# Patient Record
Sex: FEMALE | Race: WHITE | Employment: PART TIME | URBAN - METROPOLITAN AREA
[De-identification: names, ages, dates, MRNs, and addresses within clinical notes are randomized per-mention and may not be internally consistent; named-entity substitution may affect disease eponyms.]

---

## 2017-02-08 NOTE — PROGRESS NOTES
SUBJECTIVE:                                                    Yolande Yadav is a 21 year old female who presents to clinic today for the following health issues:    Musculoskeletal problem/pain      Duration: years, worse over last 2 weeks    Description  Location: right wrist    Intensity:  moderate    Accompanying signs and symptoms: weakness of wrist and swelling, tight feeling    History  Previous similar problem: no   Previous evaluation:  none    Precipitating or alleviating factors:  Trauma or overuse: no   Aggravating factors include: typing, push-ups, lifting weights, writing    Therapies tried and outcome: heat, ice and support wrap       SUBJECTIVE:  Patient presenting with 1-2 years of right wrist pain and discomfort that has worsened over the past 2-3 weeks causing her to seek medical advice. 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 She denies any history of trauma. She describes the pain as constant, 8/10 in severity and localized to the right wrist (over both the medial and lateral aspects of the anterior surface). She endorses some numbness and weakness in the same distribution as the pain. She denies tingling. She is right handed. Her pain has limited her ability to perform activities such as typing and piano playing. She exercises regularly with the soccer team which includes weight lifting, but her exercise routine and daily activities have not changed recently. Tried inbuprofen, ice, wrists brace with some minimal relief of pain.    ROS: 10 point ROS neg other than the symptoms noted above in the HPI.    OBJECTIVE:  /64 mmHg  Pulse 74  Temp(Src) 97.6  F (36.4  C) (Oral)  Resp 12  Wt 180 lb (81.647 kg)  SpO2 99%    GENERAL APPEARANCE: healthy, alert and no distress  EYES: Eyes grossly normal to inspection and conjunctivae and sclerae normal  RESP: Breathing comfortably on room air, no wheezing or respiratory distress.  CV:  extremities warm and well perfused. No edema noted.   MUSC: Tenderness to palpation over the tendons of her right wrist on the anterior aspect of her wrist. No tenderness over the posterior wrist. Right wrist has full range of motion but reports tenderness with flexion (lateral > medial; posterior = anterior flexion). No erythema, swelling or warmth noted. Strength with squeeze -5/5 on right and 5/5 left. Phalen's and Tinel's tests negative.  PSYCH: mentation appears normal and affect normal/bright       ASSESSMENT/PLAN:  Right wrist pain  (primary encounter diagnosis):  Comment: Likely tendonitis 2/2 overuse and physical activity. XR not warranted at this time in the absence of trauma.  Plan: Hand PT referral and recommend ice and NSAIDs and wrist immobilization.   -[facility], AND CHIR[facility]RACTIC REFERRAL    Scribed by Fred Chavez, Medical Student for PRETTY Zhu CNP based on the providers statements to me.    I have reviewed and edited the medical student s documentation acting as scribe and verify that the history, exam and medical decision making reflect the history, exam and decision making performed by myself today.

## 2017-02-09 ENCOUNTER — OFFICE VISIT (OUTPATIENT)
Dept: FAMILY MEDICINE | Facility: CLINIC | Age: 22
End: 2017-02-09
Payer: COMMERCIAL

## 2017-02-09 VITALS
WEIGHT: 180 LBS | RESPIRATION RATE: 12 BRPM | DIASTOLIC BLOOD PRESSURE: 64 MMHG | BODY MASS INDEX: 30.88 KG/M2 | OXYGEN SATURATION: 99 % | TEMPERATURE: 97.6 F | HEART RATE: 74 BPM | SYSTOLIC BLOOD PRESSURE: 112 MMHG

## 2017-02-09 DIAGNOSIS — M25.531 RIGHT WRIST PAIN: Primary | ICD-10-CM

## 2017-02-09 PROCEDURE — 99213 OFFICE O/P EST LOW 20 MIN: CPT | Performed by: NURSE PRACTITIONER

## 2017-02-09 NOTE — NURSING NOTE
"Chief Complaint   Patient presents with     Musculoskeletal Problem       Initial /64 mmHg  Pulse 74  Temp(Src) 97.6  F (36.4  C) (Oral)  Resp 12  Wt 180 lb (81.647 kg)  SpO2 99% Estimated body mass index is 30.88 kg/(m^2) as calculated from the following:    Height as of 11/22/16: 5' 4\" (1.626 m).    Weight as of this encounter: 180 lb (81.647 kg).  Medication Reconciliation: complete     Clara Steel, CMA    "

## 2017-02-09 NOTE — MR AVS SNAPSHOT
After Visit Summary   2/9/2017    Yolande Yadav    MRN: [id number]           Patient Information     Date Of Birth          1995        Visit Information        Provider Department      2/9/2017 9:20 AM Fabiola Nj APRN CNP [facility]        Today's Diagnoses     Right wrist pain    -  1       Care Instructions    1.  I suspect tendonitis of right wrist is causing your pain, I dont recommend an xray today.  See hand therapy to get brace and develop specific rehab exercise program.  Continue ice 3-4 times a day, ibuprofen 3-4 tabs three times a day.  If not improving in 2-3 weeks mychart me for ortho referral.        Follow-ups after your visit        Additional Services     [facility] PT, HAND, AND CHIROPRACTIC REFERRAL       **This order will print in the [facility] Scheduling Office**    Physical Therapy, Hand Therapy and Chiropractic Care are available through:    *[facility]  *[facility]  *[facility]    Call one number to schedule at any of the above locations: [phone].    Your provider has referred you to: Hand Therapy    Indication/Reason for Referral: Wrist Pain  Onset of Illness: years  Therapy Orders: Evaluate and Treat  Special Programs: None  Special Request: None    Yasir Cooley      Additional Comments for the Therapist or Chiropractor:       Please be aware that coverage of these services is subject to the terms and limitations of your health insurance plan.  Call member services at your health plan with any benefit or coverage questions.      Please bring the following to your appointment:    *Your personal calendar for scheduling future appointments  *Comfortable clothing                  Who to contact     If you have questions or need follow up information about today's clinic visit or your schedule please contact [facility] directly at [phone].  Normal or non-critical lab and imaging  results will be communicated to you by uSpeakhart, letter or phone within 4 business days after the clinic has received the results. If you do not hear from us within 7 days, please contact the clinic through Chelaile or phone. If you have a critical or abnormal lab result, we will notify you by phone as soon as possible.  Submit refill requests through Chelaile or call your pharmacy and they will forward the refill request to us. Please allow 3 business days for your refill to be completed.          Additional Information About Your Visit        Chelaile Information     Chelaile gives you secure access to your electronic health record. If you see a primary care provider, you can also send messages to your care team and make appointments. If you have questions, please call your primary care clinic.  If you do not have a primary care provider, please call [phone] and they will assist you.        Care EveryWhere ID     This is your Care EveryWhere ID. This could be used by other organizations to access your [facility] medical records  [id number]        Your Vitals Were     Pulse Temperature Respirations Pulse Oximetry          74 97.6  F (36.4  C) (Oral) 12 99%         Blood Pressure from Last 3 Encounters:   02/09/17 112/64   11/22/16 114/64   10/20/16 96/59    Weight from Last 3 Encounters:   02/09/17 180 lb (81.647 kg)   11/22/16 178 lb (80.74 kg)   10/20/16 179 lb (81.194 kg)              We Performed the Following     [facility], HAND, AND CHIROPRACTIC REFERRAL        Primary Care Provider    None Specified       No primary provider on file.        Thank you!     Thank you for choosing [facility]  for your care. Our goal is always to provide you with excellent care. Hearing back from our patients is one way we can continue to improve our services. Please take a few minutes to complete the written survey that you may receive in the mail after your visit with us. Thank you!             Your Updated  Medication List - Protect others around you: Learn how to safely use, store and throw away your medicines at www.disposemymeds.org.          This list is accurate as of: 2/9/17  9:51 AM.  Always use your most recent med list.                   Brand Name Dispense Instructions for use    ACETAMINOPHEN PO          albuterol 108 (90 BASE) MCG/ACT Inhaler    PROAIR HFA/PROVENTIL HFA/VENTOLIN HFA     Inhale 2 puffs into the lungs every 6 hours       FLUOXETINE HCL PO      Take 50 mg by mouth daily       IBUPROFEN PO

## 2017-02-09 NOTE — PATIENT INSTRUCTIONS
1.  I suspect tendonitis of right wrist is causing your pain, I dont recommend an xray today.  See hand therapy to get brace and develop specific rehab exercise program.  Continue ice 3-4 times a day, ibuprofen 3-4 tabs three times a day.  If not improving in 2-3 weeks mychart me for ortho referral.

## 2017-02-17 ENCOUNTER — TRANSFERRED RECORDS (OUTPATIENT)
Dept: HEALTH INFORMATION MANAGEMENT | Facility: CLINIC | Age: 22
End: 2017-02-17

## 2017-02-17 LAB
CHOLEST SERPL-MCNC: 153 MG/DL (ref 0–200)
GLUCOSE SERPL-MCNC: 72 MG/DL (ref 70–99)
HDLC SERPL-MCNC: 58 MG/DL (ref 40–60)
LDLC SERPL CALC-MCNC: 69 MG/DL
PAP-ABSTRACT: NORMAL
TRIGL SERPL-MCNC: 132 MG/DL (ref 30–200)

## 2017-02-23 ENCOUNTER — THERAPY VISIT (OUTPATIENT)
Dept: OCCUPATIONAL THERAPY | Facility: CLINIC | Age: 22
End: 2017-02-23
Payer: COMMERCIAL

## 2017-02-23 DIAGNOSIS — R29.898 MECHANICAL LIMB PROBLEMS: ICD-10-CM

## 2017-02-23 DIAGNOSIS — M25.531 RIGHT WRIST PAIN: Primary | ICD-10-CM

## 2017-02-23 PROCEDURE — 29125 APPL SHORT ARM SPLINT STATIC: CPT | Mod: GO | Performed by: OCCUPATIONAL THERAPIST

## 2017-02-23 PROCEDURE — 97165 OT EVAL LOW COMPLEX 30 MIN: CPT | Mod: GO | Performed by: OCCUPATIONAL THERAPIST

## 2017-02-23 NOTE — MR AVS SNAPSHOT
After Visit Summary   2/23/2017    Yolande Yadav    MRN: [id number]           Patient Information     Date Of Birth          1995        Visit Information        Provider Department      2/23/2017 9:00 AM Kalli Daniels OT [facility]        Today's Diagnoses     Right wrist pain    -  1    Mechanical limb problems           Follow-ups after your visit        Your next 10 appointments already scheduled     Mar 02, 2017  2:00 PM CST   [facility] Hand with Kalli Daniels OT   [facility])    [address] [place] [id number] [phone]            Mar 09, 2017  9:00 AM CST   [facility] Hand with Kalli Daniels OT   [facility]    [address] [place] [id number] [phone]              Who to contact     If you have questions or need follow up information about today's clinic visit or your schedule please contact [facility] directly at [phone].  Normal or non-critical lab and imaging results will be communicated to you by Ala-Septichart, letter or phone within 4 business days after the clinic has received the results. If you do not hear from us within 7 days, please contact the clinic through MommyCoacht or phone. If you have a critical or abnormal lab result, we will notify you by phone as soon as possible.  Submit refill requests through Brandpotion or call your pharmacy and they will forward the refill request to us. Please allow 3 business days for your refill to be completed.          Additional Information About Your Visit        Ala-Septichart Information     Brandpotion gives you secure access to your electronic health record. If you see a primary care provider, you can also send messages to your care team and make appointments. If you have questions, please call your primary care clinic.  If you do not have a primary  care provider, please call [phone] and they will assist you.        Care EveryWhere ID     This is your Care EveryWhere ID. This could be used by other organizations to access your [place] medical records  [id number]         Blood Pressure from Last 3 Encounters:   02/09/17 112/64   11/22/16 114/64   10/20/16 96/59    Weight from Last 3 Encounters:   02/09/17 81.6 kg (180 lb)   11/22/16 80.7 kg (178 lb)   10/20/16 81.2 kg (179 lb)              We Performed the Following     APPLY SHORT ARM SPLINT STATIC     HC OT EVAL, LOW COMPLEXITY        Primary Care Provider    None Specified       No primary provider on file.        Thank you!     Thank you for choosing [facility]  for your care. Our goal is always to provide you with excellent care. Hearing back from our patients is one way we can continue to improve our services. Please take a few minutes to complete the written survey that you may receive in the mail after your visit with us. Thank you!             Your Updated Medication List - Protect others around you: Learn how to safely use, store and throw away your medicines at www.disposemymeds.org.          This list is accurate as of: 2/23/17  6:42 PM.  Always use your most recent med list.                   Brand Name Dispense Instructions for use    ACETAMINOPHEN PO          albuterol 108 (90 BASE) MCG/ACT Inhaler    PROAIR HFA/PROVENTIL HFA/VENTOLIN HFA     Inhale 2 puffs into the lungs every 6 hours       FLUOXETINE HCL PO      Take 50 mg by mouth daily       IBUPROFEN PO

## 2017-02-23 NOTE — PROGRESS NOTES
Hand Therapy Initial Evaluation    Current Date:  2/23/2017    Diagnosis:  Right  wrist pain  DOO: 1 year ago, but much worse in past month  Referring MD: PRETTY Zhu, CNP  Order date: 2/9/17  Next MD visit:as needed      Subjective:  Yolande Yadav is a 21 year old right hand dominant female.  Patient reports symptoms of pain, weakness/loss of strength and edema (at times with use) of the right  wrist  which occurred due to insidious onset. Since onset symptoms are Gradually getting worse.  Special tests:  none.  Previous treatment: ice, brace, rest, ibuprofen.    General health as reported by patient is good.       Pertinent medical history includes:depression , asthma  Medical allergies:none.  Surgical history: none.  Medication history: anti-inflammatory (Ibuprofen as needed), anti-depressants.    Current occupation is student (adalberto) in Social Work at [employer]  Currently working in normal job without restrictions. Pt works as a . She does escorts for safety, and as to check buildings, lock and unlock doors.   Job and Student Tasks: prolonged sitting, prolonged standing, computer work  Barriers include:none  Prior functional level:  no limitations    Additional Occupational Profile Information (patterns of daily living, interests, values and needs):  Living situation: lives with a roommate in a apt/dorm on campus  Mobility: No difficulty getting around home and community  Transportation: Able to drive own car for transportation. Driving is going fine (has to take brace off)  Daily routine: Internship  Leisure activities / hobbies: Pt is on the soccer team at [employer]: she does weightlifting, conditioning, sprints (plays outside midfield)  Other: Pt is from [place]  Occupational Performance Deficits as reported by patient:Pt notes that even typing and writing has become  A problem lately. She notes that she can't do pushups and bench press hurts. Rows bother the  wrist. She has to work out with the soccer team. It really bothers the wrist to     UEFI: see flowsheet    Objective:    Observation/Appearance: normal R wrist on inspectio  Sensation:  WNL throughout all nerve distributions; per patient report     PAIN 2/23/2017     Location wrist     Description aching     Radiates Yes into volar palm     Worse d/n daytime     Frequency Constant, worse with activity     Exacerbated by Gripping, twisting, pushing (notices the most)     Relieves cold, otc medications and rest     At rest 0-10/10 4-5/10     On use 0-10/10 varies     At worst.0-10/10 8/10     Sleep disruption? no     Progression Started about a year ago but much worse in past month       Patient indicates pain is localized to: volar wrist, radial and ulnar side, narrow areas longitudinally    ROM  Wrist  2/23/2017   AROM (PROM) R L   Extension 47 70   Flexion 44 70   RD 14 33   UD 32 45   Supination 77 90   Pronation 78 85     Wrist and thumb ROM Special Test: + indicates mild pain, ++ indicates moderate pain, +++ indicates severe pain    DATE: 2/23/2017 Active Passive Resisted Comments:   Wrist ext with RD  ++ -    Wrist ext with UD  ++ -    Wrist flexion with RD  + ++    Wrist flexion with UD  + ++    Radial deviation  + +    Ulnar deviation  + +    Pronation  - +    Supination  + +        Tenderness: Pain level report on scale 0-10/10  Date 2/23/2017    Side Right  Left   Ulna Styloid - -   TFCC/fovea + -   DRUJ + -   Radial Styloid - -   Distal Radius - -   Volar Lunate - -   Dorsal Lunate - -   Volar Scaphoid + -   Dorsal Scaphoid + -   Pisiform / Triquetrum + -         Central Dorsal Zone: (+ for hypermobile or - for hypomibile) Pain 0-10/10  Date 2/23/2017    Side RIght Left   Finger Extension Test (SL--in wrist) flex, resist long ext) - -   Hunt Test (SL)  UD to RD -  (Possible hypermobility between scaphoid and lunate) -   Linscheid Test (CMC joints--stabilize distal MC, mob CMC) - -   Ballottement Scaphoid  on Lunate - -     Ulnar Dorsal Zone: (+ for hypermobile or - for hypomibile) Pain 0-10/10  Date 2/23/2017    Side RIght Left   Special tests Deferred  (may need to assess further) NT     STRENGTH:   Deferred    Assessment:   Patient presents with symptoms consistent with above diagnosis of R wrist tendinitis,  with conservative intervention.     Patient's limitations or Problem List includes:  Pain, Weakness, Hypomobility, Decreased  and Tightness in musculature of the right wrist which interferes with the patient's ability to perform Self Care Tasks, Work Tasks and Household Chores as compared to previous level of function.    Rehab Potential:  Excellent - Return to full activity, no limitations    Patient will benefit from skilled Occupational Therapy to increase ROM and overall strength and decrease pain to return to previous activity level and resume normal daily tasks and to reach their rehab potential.    Barriers to Learning:  No barrier    Communication Issues:  Patient appears to be able to clearly communicate and understand verbal and written communication and follow directions correctly.    Assessment of Occupational Performance:  3-5 Performance Deficits  Identified Performance Deficits: dressing, hygiene and grooming, home establishment and management, meal preparation and cleanup, shopping, school, work and volunteer activities      Clinical Decision Making (Complexity): Low complexity    Treatment Explanation:  The following has been discussed with the patient:  RX ordered/plan of care  Anticipated outcomes  Possible risks and side effects    Treatment Plan:   Frequency:  1 X week, once daily  Duration:  for 8 weeks     Modalities:  Fluidotherapy and Paraffin  Therapeutic Exercise:  AROM, PROM, Isotonics, Isometrics and Stabilization  Neuromuscular re-education:  Proprioceptive Training  Manual Techniques:  Joint mobilization, Friction massage and Myofascial release  Orthotic Fabrication:  Static  orthosis and Forearm based orthosis  Self Care:  Self Care Tasks, Ergonomic Considerations and Work Tasks      Discharge Plan:  Achieve all LTG.  Independent in home treatment program.  Reach maximal therapeutic benefit.    Home Exercise Program:  Gentle wrist ext/flex with table assist  Circumferential wrist orthosis for activity/rest    Next Visit:  fluido  Ther ex  F/u with orthosis

## 2017-02-24 DIAGNOSIS — M25.531 RIGHT WRIST PAIN: Primary | ICD-10-CM

## 2017-02-27 ENCOUNTER — RADIANT APPOINTMENT (OUTPATIENT)
Dept: GENERAL RADIOLOGY | Facility: CLINIC | Age: 22
End: 2017-02-27
Attending: NURSE PRACTITIONER
Payer: COMMERCIAL

## 2017-02-27 DIAGNOSIS — M25.531 RIGHT WRIST PAIN: ICD-10-CM

## 2017-02-27 PROCEDURE — 73110 X-RAY EXAM OF WRIST: CPT | Mod: RT

## 2017-02-28 NOTE — PROGRESS NOTES
Great news, your wrist xray is normal with no sign of fracture.    Sincerely,  Dr. Freda Salvador MD  2/28/2017

## 2017-03-02 ENCOUNTER — THERAPY VISIT (OUTPATIENT)
Dept: OCCUPATIONAL THERAPY | Facility: CLINIC | Age: 22
End: 2017-03-02
Payer: COMMERCIAL

## 2017-03-02 DIAGNOSIS — R29.898 MECHANICAL LIMB PROBLEMS: ICD-10-CM

## 2017-03-02 DIAGNOSIS — M25.531 RIGHT WRIST PAIN: ICD-10-CM

## 2017-03-02 PROCEDURE — 97140 MANUAL THERAPY 1/> REGIONS: CPT | Mod: GO | Performed by: OCCUPATIONAL THERAPIST

## 2017-03-02 PROCEDURE — 97022 WHIRLPOOL THERAPY: CPT | Mod: GO | Performed by: OCCUPATIONAL THERAPIST

## 2017-03-02 PROCEDURE — 97110 THERAPEUTIC EXERCISES: CPT | Mod: GO | Performed by: OCCUPATIONAL THERAPIST

## 2017-03-02 NOTE — MR AVS SNAPSHOT
After Visit Summary   3/2/2017    Yolande Yadav    MRN: [id number]           Patient Information     Date Of Birth          1995        Visit Information        Provider Department      3/2/2017 2:00 PM Kalli Daniels OT [facility]        Today's Diagnoses     Right wrist pain        Mechanical limb problems           Follow-ups after your visit        Your next 10 appointments already scheduled     Mar 09, 2017  9:00 AM CST   [facility] with Kalli Daniels OT   [facility] ([facility]    [address] [place] [id number] [phone]            Mar 23, 2017  9:30 AM CDT   [facility] Hand with Kalli Daniels OT   [facility] ([facility]    [address] [place] [id number] [phone]              Who to contact     If you have questions or need follow up information about today's clinic visit or your schedule please contact [facility] directly at [phone].  Normal or non-critical lab and imaging results will be communicated to you by Helidynehart, letter or phone within 4 business days after the clinic has received the results. If you do not hear from us within 7 days, please contact the clinic through Feedgent or phone. If you have a critical or abnormal lab result, we will notify you by phone as soon as possible.  Submit refill requests through mycirQle or call your pharmacy and they will forward the refill request to us. Please allow 3 business days for your refill to be completed.          Additional Information About Your Visit        Helidynehart Information     mycirQle gives you secure access to your electronic health record. If you see a primary care provider, you can also send messages to your care team and make appointments. If you have questions, please call your primary care clinic.  If you do not have a primary care  provider, please call [phone] and they will assist you.        Care EveryWhere ID     This is your Care EveryWhere ID. This could be used by other organizations to access your [place] medical records  [id number]         Blood Pressure from Last 3 Encounters:   02/09/17 112/64   11/22/16 114/64   10/20/16 96/59    Weight from Last 3 Encounters:   02/09/17 81.6 kg (180 lb)   11/22/16 80.7 kg (178 lb)   10/20/16 81.2 kg (179 lb)              We Performed the Following     MANUAL THER TECH,1+REGIONS,EA 15 MIN     THERAPEUTIC EXERCISES     WHIRLPOOL THERAPY        Primary Care Provider    None Specified       No primary provider on file.        Thank you!     Thank you for choosing [facility]  for your care. Our goal is always to provide you with excellent care. Hearing back from our patients is one way we can continue to improve our services. Please take a few minutes to complete the written survey that you may receive in the mail after your visit with us. Thank you!             Your Updated Medication List - Protect others around you: Learn how to safely use, store and throw away your medicines at www.disposemymeds.org.          This list is accurate as of: 3/2/17  8:08 PM.  Always use your most recent med list.                   Brand Name Dispense Instructions for use    ACETAMINOPHEN PO          albuterol 108 (90 BASE) MCG/ACT Inhaler    PROAIR HFA/PROVENTIL HFA/VENTOLIN HFA     Inhale 2 puffs into the lungs every 6 hours       FLUOXETINE HCL PO      Take 50 mg by mouth daily       IBUPROFEN PO

## 2017-03-02 NOTE — PROGRESS NOTES
Hand Therapy SOAP note  information     Current Date:  3/2/2017    Diagnosis:  Right  wrist pain  DOO: 1 year ago, but much worse in past month  Referring MD: Fabiola Nj APRN, CNP  Order date: 2/9/17  Next MD visit:as needed    xrays negative for fracture      Subjective:  The R wrist is feeling a little better. The orthosis is fitting well. Pt has been in workouts with soccer, modifying some strength training, running with orthosis on    Observation/Appearance: normal R wrist on inspection  Sensation:  WNL throughout all nerve distributions; per patient report     PAIN 2/23/2017 3/2/2017      Location wrist Volar wrist, also radial wrist somewhat    Description aching aching    Radiates Yes into volar palm     Worse d/n daytime     Frequency Constant, worse with activity     Exacerbated by Gripping, twisting, pushing (notices the most)     Relieves cold, otc medications and rest     At rest 0-10/10 4-5/10 7/10    On use 0-10/10 varies     At worst.0-10/10 8/10 8/10    Sleep disruption? no     Progression Started about a year ago but much worse in past month       Patient indicates pain is localized to: volar wrist, radial and ulnar side, narrow areas longitudinally    ROM  Wrist  2/23/2017 3/2/2017     AROM (PROM) R L R   Extension 47 70 67   Flexion 44 70 70   RD 14 33 26   UD 32 45 45   Supination 77 90    Pronation 78 85      Wrist and thumb ROM Special Test: + indicates mild pain, ++ indicates moderate pain, +++ indicates severe pain    DATE: 2/23/2017 Active Passive Resisted Comments:   Wrist ext with RD  ++ -    Wrist ext with UD  ++ -    Wrist flexion with RD  + ++    Wrist flexion with UD  + ++    Radial deviation  + +    Ulnar deviation  + +    Pronation  - +    Supination  + +        Tenderness: Pain level report on scale 0-10/10  Date 2/23/2017    Side Right  Left   Ulna Styloid - -   TFCC/fovea + -   DRUJ + -   Radial Styloid - -   Distal Radius - -   Volar Lunate - -   Dorsal Lunate - -   Volar  Scaphoid + -   Dorsal Scaphoid + -   Pisiform / Triquetrum + -         Central Dorsal Zone: (+ for hypermobile or - for hypomibile) Pain 0-10/10  Date 2/23/2017    Side RIght Left   Finger Extension Test (SL--in wrist) flex, resist long ext) - -   Hunt Test (SL)  UD to RD -  (Possible hypermobility between scaphoid and lunate) -   Linscheid Test (CMC joints--stabilize distal MC, mob CMC) - -   Ballottement Scaphoid on Lunate - -     Ulnar Dorsal Zone: (+ for hypermobile or - for hypomibile) Pain 0-10/10  Date 2/23/2017    Side RIght Left   Special tests Deferred  (may need to assess further) NT       Assessment:   Please refer to flow sheet.    Home Exercise Program:  Gentle wrist ext/flex with table assist  Circumferential wrist orthosis for activity/rest  3/2   Artisan stretch  Cross body adduction  Levator scapula stretch    Next Visit:  fluido  Ther ex and progress  Check strength

## 2017-03-09 ENCOUNTER — THERAPY VISIT (OUTPATIENT)
Dept: OCCUPATIONAL THERAPY | Facility: CLINIC | Age: 22
End: 2017-03-09
Payer: COMMERCIAL

## 2017-03-09 DIAGNOSIS — M25.531 RIGHT WRIST PAIN: ICD-10-CM

## 2017-03-09 DIAGNOSIS — R29.898 MECHANICAL LIMB PROBLEMS: ICD-10-CM

## 2017-03-09 PROCEDURE — 97140 MANUAL THERAPY 1/> REGIONS: CPT | Mod: GO | Performed by: OCCUPATIONAL THERAPIST

## 2017-03-09 PROCEDURE — 97110 THERAPEUTIC EXERCISES: CPT | Mod: GO | Performed by: OCCUPATIONAL THERAPIST

## 2017-03-09 PROCEDURE — 97018 PARAFFIN BATH THERAPY: CPT | Mod: GO | Performed by: OCCUPATIONAL THERAPIST

## 2017-03-09 NOTE — MR AVS SNAPSHOT
After Visit Summary   3/9/2017    Yolande Yadav    MRN: [id number]           Patient Information     Date Of Birth          1995        Visit Information        Provider Department      3/9/2017 9:00 AM Kalli Daniels OT [facility]        Today's Diagnoses     Right wrist pain        Mechanical limb problems           Follow-ups after your visit        Your next 10 appointments already scheduled     Mar 23, 2017  9:30 AM CDT   JOSE CRUZ Hand with Kalli Daniels OT   [facility] [place] ( [facility])    [address] [place] [id number] [phone]              Who to contact     If you have questions or need follow up information about today's clinic visit or your schedule please contact [facility] directly at [phone].  Normal or non-critical lab and imaging results will be communicated to you by [employer], letter or phone within 4 business days after the clinic has received the results. If you do not hear from us within 7 days, please contact the clinic through [employer] or phone. If you have a critical or abnormal lab result, we will notify you by phone as soon as possible.  Submit refill requests through [employer] or call your pharmacy and they will forward the refill request to us. Please allow 3 business days for your refill to be completed.          Additional Information About Your Visit        MyChart Information     [employer] gives you secure access to your electronic health record. If you see a primary care provider, you can also send messages to your care team and make appointments. If you have questions, please call your primary care clinic.  If you do not have a primary care provider, please call [phone] and they will assist you.        Care EveryWhere ID     This is your Care EveryWhere ID. This could be used by other organizations to access your [facility]  records  [id number]         Blood Pressure from Last 3 Encounters:   02/09/17 112/64   11/22/16 114/64   10/20/16 96/59    Weight from Last 3 Encounters:   02/09/17 81.6 kg (180 lb)   11/22/16 80.7 kg (178 lb)   10/20/16 81.2 kg (179 lb)              We Performed the Following     MANUAL THER TECH,1+REGIONS,EA 15 MIN     PARAFFIN BATH THERAPY     THERAPEUTIC EXERCISES        Primary Care Provider    None Specified       No primary provider on file.        Thank you!     Thank you for choosing [facility]  for your care. Our goal is always to provide you with excellent care. Hearing back from our patients is one way we can continue to improve our services. Please take a few minutes to complete the written survey that you may receive in the mail after your visit with us. Thank you!             Your Updated Medication List - Protect others around you: Learn how to safely use, store and throw away your medicines at www.disposemymeds.org.          This list is accurate as of: 3/9/17  9:47 AM.  Always use your most recent med list.                   Brand Name Dispense Instructions for use    ACETAMINOPHEN PO          albuterol 108 (90 BASE) MCG/ACT Inhaler    PROAIR HFA/PROVENTIL HFA/VENTOLIN HFA     Inhale 2 puffs into the lungs every 6 hours       FLUOXETINE HCL PO      Take 50 mg by mouth daily       IBUPROFEN PO

## 2017-03-09 NOTE — PROGRESS NOTES
Hand Therapy SOAP note  information     Current Date:  3/9/2017      Diagnosis:  Right  wrist pain  DOO: 1 year ago, but much worse in past month  Referring MD: Fabiola Nj APRN, CNP  Order date: 2/9/17  Next MD visit:as needed    xrays negative for fracture      Subjective:  Pt is wearing the splint about half the day. It is feeling a little better. She has lots of papers due soon.    Observation/Appearance: normal R wrist on inspection  Sensation:  WNL throughout all nerve distributions; per patient report     PAIN 2/23/2017 3/2/2017   3/9/2017     Location wrist Volar wrist, also radial wrist somewhat Volar wrist, also radial wrist somewhat, FCR tendon   Description aching aching    Radiates Yes into volar palm     Worse d/n daytime     Frequency Constant, worse with activity     Exacerbated by Gripping, twisting, pushing (notices the most)     Relieves cold, otc medications and rest     At rest 0-10/10 4-5/10 7/10 6/10, 4/10 post tx   On use 0-10/10 varies     At worst.0-10/10 8/10 8/10    Sleep disruption? no     Progression Started about a year ago but much worse in past month       Patient indicates pain is localized to: volar wrist, radial and ulnar side, narrow areas longitudinally    ROM  Wrist  2/23/2017 3/2/2017      AROM (PROM) R L R R   Extension 47 70 67 67   Flexion 44 70 70 70   RD 14 33 26    UD 32 45 45    Supination 77 90     Pronation 78 85       Wrist and thumb ROM Special Test: + indicates mild pain, ++ indicates moderate pain, +++ indicates severe pain    DATE: 2/23/2017 Active Passive Resisted Comments:   Wrist ext with RD  ++ -    Wrist ext with UD  ++ -    Wrist flexion with RD  + ++    Wrist flexion with UD  + ++    Radial deviation  + +    Ulnar deviation  + +    Pronation  - +    Supination  + +        Tenderness: Pain level report on scale 0-10/10  Date 2/23/2017    Side Right  Left   Ulna Styloid - -   TFCC/fovea + -   DRUJ + -   Radial Styloid - -   Distal Radius - -   Volar Lunate  - -   Dorsal Lunate - -   Volar Scaphoid + -   Dorsal Scaphoid + -   Pisiform / Triquetrum + -         Central Dorsal Zone: (+ for hypermobile or - for hypomibile) Pain 0-10/10  Date 2/23/2017    Side RIght Left   Finger Extension Test (SL--in wrist) flex, resist long ext) - -   Hunt Test (SL)  UD to RD -  (Possible hypermobility between scaphoid and lunate) -   Linscheid Test (CMC joints--stabilize distal MC, mob CMC) - -   Ballottement Scaphoid on Lunate - -     Ulnar Dorsal Zone: (+ for hypermobile or - for hypomibile) Pain 0-10/10  Date 2/23/2017    Side RIght Left   Special tests Deferred  (may need to assess further) NT     Strength:  Pain-free /Pinch Test    3/9/2017   Trials R L   1   30+ 60     Lat Pinch  3/9/2017   Trials R L   1   7 13     3 Pt Pinch  3/9/2017   Trials R L   1   13 10           Assessment:   Please refer to flow sheet.    Home Exercise Program:  Gentle wrist ext/flex with table assist  Circumferential wrist orthosis for activity/rest  3/2   Artisan stretch  Cross body adduction  Levator scapula stretch  3/9  NMReed with strap at proximal row, mobilizing during wrist flex/ext    Next Visit:  MFR T R UE proximal and distal  Possible strengthening  Check proximal strength and scap mobility  Wrist mobs

## 2017-03-23 ENCOUNTER — THERAPY VISIT (OUTPATIENT)
Dept: OCCUPATIONAL THERAPY | Facility: CLINIC | Age: 22
End: 2017-03-23
Payer: COMMERCIAL

## 2017-03-23 DIAGNOSIS — R29.898 MECHANICAL LIMB PROBLEMS: ICD-10-CM

## 2017-03-23 DIAGNOSIS — M25.531 RIGHT WRIST PAIN: ICD-10-CM

## 2017-03-23 PROCEDURE — 97110 THERAPEUTIC EXERCISES: CPT | Mod: GO | Performed by: OCCUPATIONAL THERAPIST

## 2017-03-23 PROCEDURE — 97035 APP MDLTY 1+ULTRASOUND EA 15: CPT | Mod: GO | Performed by: OCCUPATIONAL THERAPIST

## 2017-03-23 PROCEDURE — 97140 MANUAL THERAPY 1/> REGIONS: CPT | Mod: GO | Performed by: OCCUPATIONAL THERAPIST

## 2017-03-23 NOTE — PROGRESS NOTES
Hand Therapy SOAP note  information     Current Date:  3/23/2017    Diagnosis:  Right  wrist pain  DOO: 1 year ago, but much worse in past month  Referring MD: PRETTY Zhu, CNP  Order date: 2/9/17  Next MD visit:as needed    xrays negative for fracture      Subjective:  Pt notes typing is a lot better. She was in the hospital last week, so the wrist was resting. She had a UTI    Observation/Appearance: normal R wrist on inspection  Sensation:  WNL throughout all nerve distributions; per patient report     PAIN 2/23/2017 3/9/2017   3/23/2017     Location wrist Volar wrist, also radial wrist somewhat, FCR tendon    Description aching     Radiates Yes into volar palm     Worse d/n daytime     Frequency Constant, worse with activity     Exacerbated by Gripping, twisting, pushing (notices the most)     Relieves cold, otc medications and rest     At rest 0-10/10 4-5/10 6/10, 4/10 post tx 0/10   On use 0-10/10 varies  3/10 (after use)   At worst.0-10/10 8/10  7-8/10   Sleep disruption? no     Progression Started about a year ago but much worse in past month       Patient indicates pain is localized to:  ulnar side of R wrist (volar)    ROM  Wrist  2/23/2017 3/2/2017    3/23/2017     AROM (PROM) R L R R R   Extension 47 70 67 67 70   Flexion 44 70 70 70 75   RD 14 33 26     UD 32 45 45     Supination 77 90      Pronation 78 85        Wrist and thumb ROM Special Test: + indicates mild pain, ++ indicates moderate pain, +++ indicates severe pain    DATE: 2/23/2017 Active Passive Resisted Comments:   Wrist ext with RD  ++ -    Wrist ext with UD  ++ -    Wrist flexion with RD  + ++    Wrist flexion with UD  + ++    Radial deviation  + +    Ulnar deviation  + +    Pronation  - +    Supination  + +        Tenderness: Pain level report on scale 0-10/10  Date 2/23/2017    Side Right  Left   Ulna Styloid - -   TFCC/fovea + -   DRUJ + -   Radial Styloid - -   Distal Radius - -   Volar Lunate - -   Dorsal Lunate - -   Volar  Scaphoid + -   Dorsal Scaphoid + -   Pisiform / Triquetrum + -         Central Dorsal Zone: (+ for hypermobile or - for hypomibile) Pain 0-10/10  Date 2/23/2017    Side RIght Left   Finger Extension Test (SL--in wrist) flex, resist long ext) - -   Hunt Test (SL)  UD to RD -  (Possible hypermobility between scaphoid and lunate) -   Linscheid Test (CMC joints--stabilize distal MC, mob CMC) - -   Ballottement Scaphoid on Lunate - -     Ulnar Dorsal Zone: (+ for hypermobile or - for hypomibile) Pain 0-10/10  Date 2/23/2017    Side RIght Left   Special tests Deferred  (may need to assess further) NT     Strength:  Pain-free /Pinch Test    3/9/2017   Trials R L   1   30+ 60     Lat Pinch  3/9/2017   Trials R L   1   7 13     3 Pt Pinch  3/9/2017   Trials R L   1   13 10       Assessment:   Please refer to flow sheet.    Home Exercise Program:  Gentle wrist ext/flex with table assist  Circumferential wrist orthosis for activity/rest  3/2   Artisan stretch  Cross body adduction  Levator scapula stretch  Modifications for laptop and typing  Use larger pen / gel pens  3/9  NMReed with strap at proximal row, mobilizing during wrist flex/ext  3/23  Wrist stability: wall push ups on fingertips, doorframe pullbacks  Wrist mobs (took video) for  to help with gliding radius and ulna medially while pt makes fist    Next Visit:  MFR T R UE proximal and distal  F/u with strengthening, wrist supports  Check proximal strength and scap mobility  Wrist mobs / with movement

## 2017-03-23 NOTE — MR AVS SNAPSHOT
After Visit Summary   3/23/2017    Yolande Yadav    MRN: [id number]           Patient Information     Date Of Birth          1995        Visit Information        Provider Department      3/23/2017 9:30 AM Kalli Daniels OT [facility]        Today's Diagnoses     Right wrist pain        Mechanical limb problems           Follow-ups after your visit        Who to contact     If you have questions or need follow up information about today's clinic visit or your schedule please contact [facility] directly at [phone].  Normal or non-critical lab and imaging results will be communicated to you by [employer], letter or phone within 4 business days after the clinic has received the results. If you do not hear from us within 7 days, please contact the clinic through [employer] or phone. If you have a critical or abnormal lab result, we will notify you by phone as soon as possible.  Submit refill requests through [employer] or call your pharmacy and they will forward the refill request to us. Please allow 3 business days for your refill to be completed.          Additional Information About Your Visit        MyChart Information     [employer] gives you secure access to your electronic health record. If you see a primary care provider, you can also send messages to your care team and make appointments. If you have questions, please call your primary care clinic.  If you do not have a primary care provider, please call [phone] and they will assist you.        Care EveryWhere ID     This is your Care EveryWhere ID. This could be used by other organizations to access your [place] medical records  [id number]         Blood Pressure from Last 3 Encounters:   02/09/17 112/64   11/22/16 114/64   10/20/16 96/59    Weight from Last 3 Encounters:   02/09/17 81.6 kg (180 lb)   11/22/16 80.7 kg (178 lb)   10/20/16 81.2 kg (179 lb)              We Performed the Following      MANUAL THER TECH,1+REGIONS,EA 15 MIN     THERAPEUTIC EXERCISES     ULTRASOUND THERAPY        Primary Care Provider    None Specified       No primary provider on file.        Thank you!     Thank you for choosing [facility]  for your care. Our goal is always to provide you with excellent care. Hearing back from our patients is one way we can continue to improve our services. Please take a few minutes to complete the written survey that you may receive in the mail after your visit with us. Thank you!             Your Updated Medication List - Protect others around you: Learn how to safely use, store and throw away your medicines at www.disposemymeds.org.          This list is accurate as of: 3/23/17  1:27 PM.  Always use your most recent med list.                   Brand Name Dispense Instructions for use    ACETAMINOPHEN PO          albuterol 108 (90 BASE) MCG/ACT Inhaler    PROAIR HFA/PROVENTIL HFA/VENTOLIN HFA     Inhale 2 puffs into the lungs every 6 hours       FLUOXETINE HCL PO      Take 50 mg by mouth daily       IBUPROFEN PO

## 2017-06-14 ENCOUNTER — OFFICE VISIT (OUTPATIENT)
Dept: URGENT CARE | Facility: URGENT CARE | Age: 22
End: 2017-06-14
Payer: COMMERCIAL

## 2017-06-14 VITALS
DIASTOLIC BLOOD PRESSURE: 69 MMHG | WEIGHT: 191.19 LBS | BODY MASS INDEX: 32.82 KG/M2 | OXYGEN SATURATION: 98 % | HEART RATE: 97 BPM | SYSTOLIC BLOOD PRESSURE: 117 MMHG | TEMPERATURE: 98.3 F

## 2017-06-14 DIAGNOSIS — J01.90 ACUTE SINUSITIS WITH SYMPTOMS > 10 DAYS: Primary | ICD-10-CM

## 2017-06-14 PROCEDURE — 99213 OFFICE O/P EST LOW 20 MIN: CPT | Performed by: PHYSICIAN ASSISTANT

## 2017-06-14 RX ORDER — FLUTICASONE PROPIONATE 50 MCG
1 SPRAY, SUSPENSION (ML) NASAL DAILY
COMMUNITY

## 2017-06-14 RX ORDER — CITALOPRAM HYDROBROMIDE 20 MG/1
20 TABLET ORAL DAILY
COMMUNITY
End: 2018-10-08

## 2017-06-14 RX ORDER — CEFUROXIME AXETIL 500 MG/1
500 TABLET ORAL 2 TIMES DAILY
Qty: 20 TABLET | Refills: 0 | Status: SHIPPED | OUTPATIENT
Start: 2017-06-14 | End: 2018-07-09

## 2017-06-14 RX ORDER — ALPRAZOLAM 0.25 MG
0.25 TABLET ORAL PRN
COMMUNITY
End: 2018-10-08

## 2017-06-14 NOTE — NURSING NOTE
"Chief Complaint   Patient presents with     Cough     cough, throat pain, stuffy nose,  trouble breathing, headache and neck pain for almost two weeks.        Initial /69  Pulse 97  Temp 98.3  F (36.8  C) (Oral)  Wt 191 lb 3 oz (86.7 kg)  SpO2 98%  BMI 32.82 kg/m2 Estimated body mass index is 32.82 kg/(m^2) as calculated from the following:    Height as of 11/22/16: 5' 4\" (1.626 m).    Weight as of this encounter: 191 lb 3 oz (86.7 kg).  Medication Reconciliation: complete    "

## 2017-06-14 NOTE — MR AVS SNAPSHOT
After Visit Summary   6/14/2017    Yolande Yadav    MRN: [id number]           Patient Information     Date Of Birth          1995        Visit Information        Provider Department      6/14/2017 10:30 AM Radha Duong PA-C [facility]        Today's Diagnoses     Acute sinusitis with symptoms > 10 days    -  1       Follow-ups after your visit        Who to contact     If you have questions or need follow up information about today's clinic visit or your schedule please contact [facility] directly at [phone].  Normal or non-critical lab and imaging results will be communicated to you by MyChart, letter or phone within 4 business days after the clinic has received the results. If you do not hear from us within 7 days, please contact the clinic through [employer] or phone. If you have a critical or abnormal lab result, we will notify you by phone as soon as possible.  Submit refill requests through [employer] or call your pharmacy and they will forward the refill request to us. Please allow 3 business days for your refill to be completed.          Additional Information About Your Visit        MyChart Information     [employer] gives you secure access to your electronic health record. If you see a primary care provider, you can also send messages to your care team and make appointments. If you have questions, please call your primary care clinic.  If you do not have a primary care provider, please call [phone] and they will assist you.        Care EveryWhere ID     This is your Care EveryWhere ID. This could be used by other organizations to access your [place] medical records  [id number]        Your Vitals Were     Pulse Temperature Pulse Oximetry BMI (Body Mass Index)          97 98.3  F (36.8  C) (Oral) 98% 32.82 kg/m2         Blood Pressure from Last 3 Encounters:   06/14/17 117/69   02/09/17 112/64   11/22/16 114/64     Weight from Last 3 Encounters:   06/14/17 191 lb 3 oz (86.7 kg)   02/09/17 180 lb (81.6 kg)   11/22/16 178 lb (80.7 kg)              Today, you had the following     No orders found for display         Today's Medication Changes          These changes are accurate as of: 6/14/17 11:34 AM.  If you have any questions, ask your nurse or doctor.               Start taking these medicines.        Dose/Directions    cefuroxime 500 MG tablet   Commonly known as:  CEFTIN   Used for:  Acute sinusitis with symptoms > 10 days   Started by:  Radha Duong PA-C        Dose:  500 mg   Take 1 tablet (500 mg) by mouth 2 times daily   Quantity:  20 tablet   Refills:  0            Where to get your medicines      These medications were sent to [id number] - [place], [place] - [address] AT [facility]  [place]  [address], [place] [address]    Hours:  24-hours Phone:  [phone]     cefuroxime 500 MG tablet                Primary Care Provider    None Specified       No primary provider on file.        Thank you!     Thank you for choosing [facility]  for your care. Our goal is always to provide you with excellent care. Hearing back from our patients is one way we can continue to improve our services. Please take a few minutes to complete the written survey that you may receive in the mail after your visit with us. Thank you!             Your Updated Medication List - Protect others around you: Learn how to safely use, store and throw away your medicines at www.disposemymeds.org.          This list is accurate as of: 6/14/17 11:34 AM.  Always use your most recent med list.                   Brand Name Dispense Instructions for use    ACETAMINOPHEN PO          albuterol 108 (90 BASE) MCG/ACT Inhaler    PROAIR HFA/PROVENTIL HFA/VENTOLIN HFA     Inhale 2 puffs into the lungs every 6 hours       ALPRAZolam 0.25 MG tablet    XANAX     Take 0.25 mg by mouth  as needed for anxiety       cefuroxime 500 MG tablet    CEFTIN    20 tablet    Take 1 tablet (500 mg) by mouth 2 times daily       citalopram 20 MG tablet    celeXA     Take 20 mg by mouth daily       fluticasone 50 MCG/ACT spray    FLONASE     Spray 1 spray into both nostrils daily       IBUPROFEN PO          UNKNOWN TO PATIENT      Allergy tablet daily.

## 2017-06-14 NOTE — PROGRESS NOTES
SUBJECTIVE:   Yolande Yadav is a 21 year old female presenting with a chief complaint of:  1) nasal congestion for the past couple of weeks, worsening with sinus headache  2) cough  3) sneezing.  4) some body aches    Onset of symptoms was 2 week(s) ago.  Course of illness is worsening.    Severity moderate  Current and Associated symptoms: as above, now with some neck pain as well.  Treatment measures tried include allegra and flonase.  Predisposing factors include seasonal allergies.    SH: just moved into an older home    Past Medical History:   Diagnosis Date     Pyelonephritis     recurrent as child     Patient Active Problem List   Diagnosis     Acute right-sided low back pain without sciatica     CARDIOVASCULAR SCREENING; LDL GOAL LESS THAN 160     History of pyelonephritis     Right wrist pain     Mechanical limb problems     Social History   Substance Use Topics     Smoking status: Never Smoker     Smokeless tobacco: Never Used     Alcohol use Not on file       ROS:  CONSTITUTIONAL:NEGATIVE for fever, chills, change in weight  INTEGUMENTARY/SKIN: NEGATIVE for worrisome rashes, moles or lesions  EYES: NEGATIVE for vision changes or irritation  ENT/MOUTH: as per HPI  RESP:as per HPI  CV: NEGATIVE for chest pain, palpitations or peripheral edema  GI: NEGATIVE for nausea, abdominal pain, heartburn, or change in bowel habits  MUSCULOSKELETAL: as per HPI    OBJECTIVE  :/69  Pulse 97  Temp 98.3  F (36.8  C) (Oral)  Wt 191 lb 3 oz (86.7 kg)  SpO2 98%  BMI 32.82 kg/m2  GENERAL APPEARANCE: healthy, alert and no distress  EYES: EOMI,  PERRL, conjunctiva clear  HENT: ear canals and TM's normal.  Nose with boggy turbinates and yellow coryza and mouth without ulcers, erythema or lesions  NECK: supple, nontender, no lymphadenopathy.  Tenderness over trapezius bilaterally.  No vertebral tenderness. ROM wnl.   RESP: lungs clear to auscultation - no rales, rhonchi or wheezes  CV: regular rates and rhythm, normal  S1 S2, no murmur noted  ABDOMEN:  soft, nontender, no HSM or masses and bowel sounds normal  NEURO: Normal strength and tone, sensory exam grossly normal,  normal speech and mentation  SKIN: no suspicious lesions or rashes    (J01.90) Acute sinusitis with symptoms > 10 days  (primary encounter diagnosis)  Comment: with underlying allergies  Plan: cefuroxime (CEFTIN) 500 MG tablet        Saline nasal spray  Continue flonase and allergra    F/U with PCP should symptoms persist or worsen.    Patient expresses understanding and agreement with the assessment and plan as above.

## 2017-08-26 ENCOUNTER — OFFICE VISIT (OUTPATIENT)
Dept: URGENT CARE | Facility: URGENT CARE | Age: 22
End: 2017-08-26
Payer: COMMERCIAL

## 2017-08-26 VITALS
TEMPERATURE: 97.2 F | HEIGHT: 63 IN | BODY MASS INDEX: 32.6 KG/M2 | HEART RATE: 65 BPM | SYSTOLIC BLOOD PRESSURE: 101 MMHG | RESPIRATION RATE: 16 BRPM | DIASTOLIC BLOOD PRESSURE: 59 MMHG | WEIGHT: 184 LBS | OXYGEN SATURATION: 97 %

## 2017-08-26 DIAGNOSIS — L03.818 CELLULITIS OF OTHER SPECIFIED SITE: Primary | ICD-10-CM

## 2017-08-26 PROCEDURE — 99213 OFFICE O/P EST LOW 20 MIN: CPT | Performed by: FAMILY MEDICINE

## 2017-08-26 NOTE — NURSING NOTE
"Chief Complaint   Patient presents with     Derm Problem     left breast rash 6 days     initial /59 (BP Location: Left arm, Cuff Size: Adult Regular)  Pulse 65  Temp 97.2  F (36.2  C) (Oral)  Resp 16  Ht 5' 3\" (1.6 m)  Wt 184 lb (83.5 kg)  SpO2 97%  BMI 32.59 kg/m2 Estimated body mass index is 32.59 kg/(m^2) as calculated from the following:    Height as of this encounter: 5' 3\" (1.6 m).    Weight as of this encounter: 184 lb (83.5 kg).  BP completed using cuff size: regular.  L  arm      Health Maintenance that is potentially due pending provider review:  NONE    n/a    Harman Chen ma  "

## 2017-08-26 NOTE — MR AVS SNAPSHOT
"              After Visit Summary   8/26/2017    Yolande Yadav    MRN: [id number]           Patient Information     Date Of Birth          1995        Visit Information        Provider Department      8/26/2017 4:20 PM Ada Calvert MD [facility]        Today's Diagnoses     Cellulitis of other specified site    -  1       Follow-ups after your visit        Follow-up notes from your care team     Return if symptoms worsen or fail to improve.      Who to contact     If you have questions or need follow up information about today's clinic visit or your schedule please contact [facility] directly at [phone].  Normal or non-critical lab and imaging results will be communicated to you by MyChart, letter or phone within 4 business days after the clinic has received the results. If you do not hear from us within 7 days, please contact the clinic through Slicethepiehart or phone. If you have a critical or abnormal lab result, we will notify you by phone as soon as possible.  Submit refill requests through Sosh or call your pharmacy and they will forward the refill request to us. Please allow 3 business days for your refill to be completed.          Additional Information About Your Visit        MyChart Information     Sosh gives you secure access to your electronic health record. If you see a primary care provider, you can also send messages to your care team and make appointments. If you have questions, please call your primary care clinic.  If you do not have a primary care provider, please call [phone] and they will assist you.        Care EveryWhere ID     This is your Care EveryWhere ID. This could be used by other organizations to access your [employer] medical records  [id number]        Your Vitals Were     Pulse Temperature Respirations Height Pulse Oximetry BMI (Body Mass Index)    65 97.2  F (36.2  C) (Oral) 16 5' 3\" (1.6 m) 97% " 32.59 kg/m2       Blood Pressure from Last 3 Encounters:   08/26/17 101/59   06/14/17 117/69   02/09/17 112/64    Weight from Last 3 Encounters:   08/26/17 184 lb (83.5 kg)   06/14/17 191 lb 3 oz (86.7 kg)   02/09/17 180 lb (81.6 kg)              Today, you had the following     No orders found for display         Today's Medication Changes          These changes are accurate as of: 8/26/17  4:40 PM.  If you have any questions, ask your nurse or doctor.               Start taking these medicines.        Dose/Directions    amoxicillin-clavulanate 875-125 MG per tablet   Commonly known as:  AUGMENTIN   Used for:  Cellulitis of other specified site   Started by:  Ada Calvert MD        Dose:  1 tablet   Take 1 tablet by mouth 2 times daily   Quantity:  28 tablet   Refills:  0            Where to get your medicines      These medications were sent to [id number] - [place], [place] - [address] AT [facility]  [facility] [address], [place] [address]    Hours:  24-hours Phone:  [phone]     amoxicillin-clavulanate 875-125 MG per tablet                Primary Care Provider    None Specified       No primary provider on file.        Equal Access to Services     KATHLEEN ANN AH: Hadgutierrez gallegoo Soomaali, waaxda luqadaha, qaybta kaalmada adeegyada, waxay jamison crisostomo. So [facility] [phone].    ATENCIÓN: Si habla español, tiene a young disposición servicios gratuitos de asistencia lingüística. Ll[facility] [phone].    We comply with applicable federal civil rights laws and [place] laws. We do not discriminate on the basis of race, color, national origin, age, disability sex, sexual orientation or gender identity.            Thank you!     Thank you for choosing [facility]  for your care. Our goal is always to provide you with excellent care. Hearing back from our patients is one way we can continue to improve our  services. Please take a few minutes to complete the written survey that you may receive in the mail after your visit with us. Thank you!             Your Updated Medication List - Protect others around you: Learn how to safely use, store and throw away your medicines at www.disposemymeds.org.          This list is accurate as of: 8/26/17  4:40 PM.  Always use your most recent med list.                   Brand Name Dispense Instructions for use Diagnosis    ACETAMINOPHEN PO           albuterol 108 (90 BASE) MCG/ACT Inhaler    PROAIR HFA/PROVENTIL HFA/VENTOLIN HFA     Inhale 2 puffs into the lungs every 6 hours        ALPRAZolam 0.25 MG tablet    XANAX     Take 0.25 mg by mouth as needed for anxiety        amoxicillin-clavulanate 875-125 MG per tablet    AUGMENTIN    28 tablet    Take 1 tablet by mouth 2 times daily    Cellulitis of other specified site       cefuroxime 500 MG tablet    CEFTIN    20 tablet    Take 1 tablet (500 mg) by mouth 2 times daily    Acute sinusitis with symptoms > 10 days       citalopram 20 MG tablet    celeXA     Take 20 mg by mouth daily        fluticasone 50 MCG/ACT spray    FLONASE     Spray 1 spray into both nostrils daily        IBUPROFEN PO           UNKNOWN TO PATIENT      Allergy tablet daily.

## 2017-09-25 ENCOUNTER — OFFICE VISIT (OUTPATIENT)
Dept: URGENT CARE | Facility: URGENT CARE | Age: 22
End: 2017-09-25
Payer: COMMERCIAL

## 2017-09-25 VITALS
HEART RATE: 58 BPM | DIASTOLIC BLOOD PRESSURE: 63 MMHG | SYSTOLIC BLOOD PRESSURE: 111 MMHG | WEIGHT: 184 LBS | HEIGHT: 63 IN | OXYGEN SATURATION: 99 % | TEMPERATURE: 97.5 F | BODY MASS INDEX: 32.6 KG/M2

## 2017-09-25 DIAGNOSIS — M26.621 ARTHRALGIA OF RIGHT TEMPOROMANDIBULAR JOINT: Primary | ICD-10-CM

## 2017-09-25 PROCEDURE — 99213 OFFICE O/P EST LOW 20 MIN: CPT | Performed by: INTERNAL MEDICINE

## 2017-09-25 RX ORDER — NAPROXEN 500 MG/1
500 TABLET ORAL 2 TIMES DAILY WITH MEALS
Qty: 60 TABLET | Refills: 0 | Status: SHIPPED | OUTPATIENT
Start: 2017-09-25 | End: 2018-10-08

## 2017-09-25 NOTE — MR AVS SNAPSHOT
After Visit Summary   9/25/2017    Yolande Yadav    MRN: [id number]           Patient Information     Date Of Birth          1995        Visit Information        Provider Department      9/25/2017 7:00 PM Lora Arias MD [facility]        Today's Diagnoses     Arthralgia of right temporomandibular joint    -  1      Care Instructions    Naprosyn 500 mg twice a day with food.  Ice    Call or return to clinic if symptoms worsen or fail to improve as anticipated.      Helping Your Temporomandibular Joint (TMJ) Heal  The temporomandibular joint (TMJ) is a ball-and-socket joint located where the upper and lower jaws meet. When the TMJ and related muscles are injured, they need time to heal. Self-care is very important. You can take steps to reduce pressure on the TMJ and speed healing.    Eating with care  Chewing strains the TMJ. When symptoms are bad, you may not be able to chew at all. To get you through times when your symptoms are at their worst, try these tips:    Choose soft foods, like scrambled eggs, oatmeal, yogurt, quiche, tofu, soup, smoothies, pasta, fish, mashed potatoes, milkshakes, bananas, applesauce, gelatin, or ice cream.    Avoid biting into hard foods, like whole apples, carrots, and corn on the cob. Instead, cut foods into bite-sized pieces.    Grind or finely chop meats and other tough foods. Try hamburger meat instead of steak.  Using ice and heat  Your health care provider may suggest using ice and heat. Ice helps reduce swelling and pain. Heat helps relax muscles, increasing blood flow.    Use a gel pack or ice wrapped in a towel for severe pain. Apply for 10 to 20 minutes. Repeat as needed.    Use moist heat for mild to moderate muscle pain. Apply a moist, warm towel to the muscles for 10 to 20 minutes. Repeat as needed.  Avoiding triggers  Certain activities (called triggers) strain the TMJ, making symptoms worse. The tips below can help you  avoid common triggers and limit strain.    Avoid hard or chewy foods, like nuts, pretzels, popcorn, chips, gum, caramel, gummy candies, carrots, whole apples, hard breads, and even ice.    Reschedule routine dental visits, like cleanings, if your jaw aches. If you have severe pain, call your health care provider.    Support your jaw when yawning. When you feel a yawn coming on, put a fist under your jaw. Apply gentle pressure. This helps prevent wide, painful yawns.    Avoid any activity that hurts, like nail biting, yelling, and singing.  Maintaining good posture  Work at improving your posture during the day and when you sleep. Good posture can help your body heal. Try these tips:    Use a headset when on the telephone. Don t cradle the phone with your shoulder.    Keep ergonomics in mind. This includes making sure your workstation fits your body. Support your lower back. Take frequent breaks to stretch and rest. If you use a computer, keep the monitor at eye level.    Keep your head in a neutral position, with your ears in line with your shoulders. Don t slouch or crane your head forward.    Use an orthopaedic pillow to support your head and neck during sleep.  Date Last Reviewed: 7/13/2015 2000-2017 [employer]. [address] [place] [address], [place] [address]. All rights reserved. This information is not intended as a substitute for professional medical care. Always follow your healthcare professional's instructions.        When You Have Temporomandibular Disorder (TMD)  The temporomandibular joint (TMJ) is a ball-and-socket joint located where the upper and lower jaws meet. The TMJ and its nearby muscles make up a complex, loosely connected system. Because of this, a problem in one part of the system can affect the other parts. This can cause you to have temporomandibular disorder (TMD).         How the temporomandibular joint works  You have 1 joint on each side of your mouth that together make up  the TMJ. These joints are part of a large group of muscles, ligaments, and bones that work together as a system. When the system is healthy, you can talk, chew, and even yawn in comfort. Muscles contract and relax to open and close the joint. The disk is made of cartilage and is located between the condyle and the skull. It absorbs pressure in the joint and allows the jaws to open and close smoothly. Fluid (called synovial fluid) lubricates the joints. Ligaments connect the lower jaw bones to the skull. They also support the joint.  Common temporomandibular problems  When there is a problem with the TMJ and its related system, you can develop TMD. Common TMD problems include tight muscles, inflamed joints, and damaged joints.  In some cases, symptoms may be related to the teeth or bite.    Tight muscles. The muscles surrounding the TMJ can go into spasm (tighten) and cause pain. Referred pain happens in a part of the body separate from the source of the problem. For example, pain in the face or teeth could be coming from a problem in the TMJ. Myofascial pain happens in soft tissues, like muscle. Trigger points in these pain areas often cause referred pain. You may feel jaw, neck, or shoulder pain.    Inflamed joints. Inflammation may include pain, redness, heat, swelling, or loss of function. Synovitis happens when certain tissues surrounding the TMJ become inflamed. It causes pain that increases with jaw movement. Inflamed ligamentscan be caused by strain or injury. When this happens, the ligaments are unable to support the joint. Rheumatoid arthritis is a joint disease. It leads to inflammation in the TMJ.    Damaged joints. Many people hear clicking when their jaw moves. If you feel pain along with the noise, the joint may be damaged. Impingement happens when the disk slips out of place (displacement). This causes the jaw to catch. As the disk slips, you may hear a clicking sound. Locked jaw happens when the disk  gets stuck in one position. As a result, the jaw locks open or closed. Osteoarthritis is a joint disease. It causes the TMJ to wear away (degenerate). This leads to pain during movement.     Other problems  The parts of the jaw and mouth make up a single unit. That s why a problem in 1 area can cause symptoms elsewhere. Teeth or bite problems associated with TMD include:    Bruxism (grinding your teeth side to side)    Clenching (biting down on your teeth)    Malocclusion (when the teeth or bite is out of alignment)  Your health care provider will give you more information about these problems if needed.   Date Last Reviewed: 7/13/2015 2000-2017 [employer]. [address] [place] [address], [place] [address]. All rights reserved. This information is not intended as a substitute for professional medical care. Always follow your healthcare professional's instructions.                Follow-ups after your visit        Who to contact     If you have questions or need follow up information about today's clinic visit or your schedule please contact [facility] directly at [phone].  Normal or non-critical lab and imaging results will be communicated to you by Kapitallhart, letter or phone within 4 business days after the clinic has received the results. If you do not hear from us within 7 days, please contact the clinic through PurpleCowt or phone. If you have a critical or abnormal lab result, we will notify you by phone as soon as possible.  Submit refill requests through 3scale or call your pharmacy and they will forward the refill request to us. Please allow 3 business days for your refill to be completed.          Additional Information About Your Visit        3scale Information     3scale gives you secure access to your electronic health record. If you see a primary care provider, you can also send messages to your care team and make appointments. If you have questions, please call your  "primary care clinic.  If you do not have a primary care provider, please call [phone] and they will assist you.        Care EveryWhere ID     This is your Care EveryWhere ID. This could be used by other organizations to access your [place] medical records  [id number]        Your Vitals Were     Pulse Temperature Height Last Period Pulse Oximetry Breastfeeding?    58 97.5  F (36.4  C) (Oral) 5' 3\" (1.6 m) 09/19/2017 99% No    BMI (Body Mass Index)                   32.59 kg/m2            Blood Pressure from Last 3 Encounters:   09/25/17 111/63   08/26/17 101/59   06/14/17 117/69    Weight from Last 3 Encounters:   09/25/17 184 lb (83.5 kg)   08/26/17 184 lb (83.5 kg)   06/14/17 191 lb 3 oz (86.7 kg)              Today, you had the following     No orders found for display         Today's Medication Changes          These changes are accurate as of: 9/25/17  8:56 PM.  If you have any questions, ask your nurse or doctor.               Start taking these medicines.        Dose/Directions    naproxen 500 MG tablet   Commonly known as:  NAPROSYN   Used for:  Arthralgia of right temporomandibular joint   Started by:  Lora Arias MD        Dose:  500 mg   Take 1 tablet (500 mg) by mouth 2 times daily (with meals)   Quantity:  60 tablet   Refills:  0            Where to get your medicines      These medications were sent to [id number] - [place], [place] - [address] AT [facility]  [facility] [address], [place] [address]    Hours:  24-hours Phone:  [phone]     naproxen 500 MG tablet                Primary Care Provider    None Specified       No primary provider on file.        Equal Access to Services     KATHLEEN ANN AH: Carlos Krishna, zuleyma pereira, qaallisonta kaalmada apurva, darshana crisostomo. So [facility] [phone].    ATENCIÓN: Si habla español, tiene a young disposición servicios gratuitos de asistencia lingüística. " Adenike schmid [phone].    We comply with applicable federal civil rights laws and [place] laws. We do not discriminate on the basis of race, color, national origin, age, disability sex, sexual orientation or gender identity.            Thank you!     Thank you for choosing [facility]  for your care. Our goal is always to provide you with excellent care. Hearing back from our patients is one way we can continue to improve our services. Please take a few minutes to complete the written survey that you may receive in the mail after your visit with us. Thank you!             Your Updated Medication List - Protect others around you: Learn how to safely use, store and throw away your medicines at www.disposemymeds.org.          This list is accurate as of: 9/25/17  8:56 PM.  Always use your most recent med list.                   Brand Name Dispense Instructions for use Diagnosis    ACETAMINOPHEN PO           albuterol 108 (90 BASE) MCG/ACT Inhaler    PROAIR HFA/PROVENTIL HFA/VENTOLIN HFA     Inhale 2 puffs into the lungs every 6 hours        ALPRAZolam 0.25 MG tablet    XANAX     Take 0.25 mg by mouth as needed for anxiety        amoxicillin-clavulanate 875-125 MG per tablet    AUGMENTIN    28 tablet    Take 1 tablet by mouth 2 times daily    Cellulitis of other specified site       cefuroxime 500 MG tablet    CEFTIN    20 tablet    Take 1 tablet (500 mg) by mouth 2 times daily    Acute sinusitis with symptoms > 10 days       citalopram 20 MG tablet    celeXA     Take 20 mg by mouth daily        fluticasone 50 MCG/ACT spray    FLONASE     Spray 1 spray into both nostrils daily        IBUPROFEN PO           naproxen 500 MG tablet    NAPROSYN    60 tablet    Take 1 tablet (500 mg) by mouth 2 times daily (with meals)    Arthralgia of right temporomandibular joint       UNKNOWN TO PATIENT      Allergy tablet daily.

## 2017-09-26 NOTE — PATIENT INSTRUCTIONS
Naprosyn 500 mg twice a day with food.  Ice    Call or return to clinic if symptoms worsen or fail to improve as anticipated.      Helping Your Temporomandibular Joint (TMJ) Heal  The temporomandibular joint (TMJ) is a ball-and-socket joint located where the upper and lower jaws meet. When the TMJ and related muscles are injured, they need time to heal. Self-care is very important. You can take steps to reduce pressure on the TMJ and speed healing.    Eating with care  Chewing strains the TMJ. When symptoms are bad, you may not be able to chew at all. To get you through times when your symptoms are at their worst, try these tips:    Choose soft foods, like scrambled eggs, oatmeal, yogurt, quiche, tofu, soup, smoothies, pasta, fish, mashed potatoes, milkshakes, bananas, applesauce, gelatin, or ice cream.    Avoid biting into hard foods, like whole apples, carrots, and corn on the cob. Instead, cut foods into bite-sized pieces.    Grind or finely chop meats and other tough foods. Try hamburger meat instead of steak.  Using ice and heat  Your health care provider may suggest using ice and heat. Ice helps reduce swelling and pain. Heat helps relax muscles, increasing blood flow.    Use a gel pack or ice wrapped in a towel for severe pain. Apply for 10 to 20 minutes. Repeat as needed.    Use moist heat for mild to moderate muscle pain. Apply a moist, warm towel to the muscles for 10 to 20 minutes. Repeat as needed.  Avoiding triggers  Certain activities (called triggers) strain the TMJ, making symptoms worse. The tips below can help you avoid common triggers and limit strain.    Avoid hard or chewy foods, like nuts, pretzels, popcorn, chips, gum, caramel, gummy candies, carrots, whole apples, hard breads, and even ice.    Reschedule routine dental visits, like cleanings, if your jaw aches. If you have severe pain, call your health care provider.    Support your jaw when yawning. When you feel a yawn coming on, put a fist  under your jaw. Apply gentle pressure. This helps prevent wide, painful yawns.    Avoid any activity that hurts, like nail biting, yelling, and singing.  Maintaining good posture  Work at improving your posture during the day and when you sleep. Good posture can help your body heal. Try these tips:    Use a headset when on the telephone. Don t cradle the phone with your shoulder.    Keep ergonomics in mind. This includes making sure your workstation fits your body. Support your lower back. Take frequent breaks to stretch and rest. If you use a computer, keep the monitor at eye level.    Keep your head in a neutral position, with your ears in line with your shoulders. Don t slouch or crane your head forward.    Use an orthopaedic pillow to support your head and neck during sleep.  Date Last Reviewed: 7/13/2015 2000-2017 [employer]. [address] [place] [address]. All rights reserved. This information is not intended as a substitute for professional medical care. Always follow your healthcare professional's instructions.        When You Have Temporomandibular Disorder (TMD)  The temporomandibular joint (TMJ) is a ball-and-socket joint located where the upper and lower jaws meet. The TMJ and its nearby muscles make up a complex, loosely connected system. Because of this, a problem in one part of the system can affect the other parts. This can cause you to have temporomandibular disorder (TMD).         How the temporomandibular joint works  You have 1 joint on each side of your mouth that together make up the TMJ. These joints are part of a large group of muscles, ligaments, and bones that work together as a system. When the system is healthy, you can talk, chew, and even yawn in comfort. Muscles contract and relax to open and close the joint. The disk is made of cartilage and is located between the condyle and the skull. It absorbs pressure in the joint and allows the jaws to open and close  smoothly. Fluid (called synovial fluid) lubricates the joints. Ligaments connect the lower jaw bones to the skull. They also support the joint.  Common temporomandibular problems  When there is a problem with the TMJ and its related system, you can develop TMD. Common TMD problems include tight muscles, inflamed joints, and damaged joints.  In some cases, symptoms may be related to the teeth or bite.    Tight muscles. The muscles surrounding the TMJ can go into spasm (tighten) and cause pain. Referred pain happens in a part of the body separate from the source of the problem. For example, pain in the face or teeth could be coming from a problem in the TMJ. Myofascial pain happens in soft tissues, like muscle. Trigger points in these pain areas often cause referred pain. You may feel jaw, neck, or shoulder pain.    Inflamed joints. Inflammation may include pain, redness, heat, swelling, or loss of function. Synovitis happens when certain tissues surrounding the TMJ become inflamed. It causes pain that increases with jaw movement. Inflamed ligamentscan be caused by strain or injury. When this happens, the ligaments are unable to support the joint. Rheumatoid arthritis is a joint disease. It leads to inflammation in the TMJ.    Damaged joints. Many people hear clicking when their jaw moves. If you feel pain along with the noise, the joint may be damaged. Impingement happens when the disk slips out of place (displacement). This causes the jaw to catch. As the disk slips, you may hear a clicking sound. Locked jaw happens when the disk gets stuck in one position. As a result, the jaw locks open or closed. Osteoarthritis is a joint disease. It causes the TMJ to wear away (degenerate). This leads to pain during movement.     Other problems  The parts of the jaw and mouth make up a single unit. That s why a problem in 1 area can cause symptoms elsewhere. Teeth or bite problems associated with TMD  include:    Bruxism (grinding your teeth side to side)    Clenching (biting down on your teeth)    Malocclusion (when the teeth or bite is out of alignment)  Your health care provider will give you more information about these problems if needed.   Date Last Reviewed: 7/13/2015 2000-2017 The "[employer] [address] [place] [address], [place] [address]. All rights reserved. This information is not intended as a substitute for professional medical care. Always follow your healthcare professional's instructions.

## 2017-09-26 NOTE — NURSING NOTE
"Chief Complaint   Patient presents with     Urgent Care     Jaw Pain     c/o jaw pain off and on for 4 months       Initial /63  Pulse 58  Temp 97.5  F (36.4  C) (Oral)  Ht 5' 3\" (1.6 m)  Wt 184 lb (83.5 kg)  LMP 09/19/2017  SpO2 99%  Breastfeeding? No  BMI 32.59 kg/m2 Estimated body mass index is 32.59 kg/(m^2) as calculated from the following:    Height as of this encounter: 5' 3\" (1.6 m).    Weight as of this encounter: 184 lb (83.5 kg).  Medication Reconciliation: complete   Susanne Mccloud MA    "

## 2017-09-26 NOTE — PROGRESS NOTES
"SUBJECTIVE:  Yolande Yadav, a 22 year old female scheduled an appointment to discuss the following issues:  Chief Complaint   Patient presents with     Urgent Care     Jaw Pain     c/o jaw pain off and on for 4 months     When clenches jaw, eating, talking bothers jaw    Dentist - seen 8/20217 - stated she needed to point out tooth that hurt.      treatment ibuprofen     No uri symptoms   Or sore throat     Patient Active Problem List   Diagnosis     Acute right-sided low back pain without sciatica     CARDIOVASCULAR SCREENING; LDL GOAL LESS THAN 160     History of pyelonephritis     Right wrist pain     Mechanical limb problems     Past Surgical History:   Procedure Laterality Date     NO HISTORY OF SURGERY         Social History   Substance Use Topics     Smoking status: Never Smoker     Smokeless tobacco: Never Used     Alcohol use Not on file     Family History   Problem Relation Age of Onset     Gallbladder Disease Other            Current Outpatient Prescriptions on File Prior to Visit:  amoxicillin-clavulanate (AUGMENTIN) 875-125 MG per tablet Take 1 tablet by mouth 2 times daily   fluticasone (FLONASE) 50 MCG/ACT spray Spray 1 spray into both nostrils daily   ALPRAZolam (XANAX) 0.25 MG tablet Take 0.25 mg by mouth as needed for anxiety   citalopram (CELEXA) 20 MG tablet Take 20 mg by mouth daily   UNKNOWN TO PATIENT Allergy tablet daily.   cefuroxime (CEFTIN) 500 MG tablet Take 1 tablet (500 mg) by mouth 2 times daily   albuterol (PROAIR HFA, PROVENTIL HFA, VENTOLIN HFA) 108 (90 BASE) MCG/ACT inhaler Inhale 2 puffs into the lungs every 6 hours   IBUPROFEN PO    ACETAMINOPHEN PO      No current facility-administered medications on file prior to visit.       Medical, social, surgical, and family histories reviewed and updated as of 9/25/2017.      OBJECTIVE:  /63  Pulse 58  Temp 97.5  F (36.4  C) (Oral)  Ht 5' 3\" (1.6 m)  Wt 184 lb (83.5 kg)  LMP 09/19/2017  SpO2 99%  Breastfeeding? No  BMI 32.59 " kg/m2  EXAM:  GENERAL APPEARANCE: healthy, alert and no distress  HENT: ear canals and TM's normal andmouth without ulcers or lesions/ no tender teeth  No gum irritation  No tragus pain  Pain right tmj      ASSESSMENT/PLAN:    ASSESSMENT/PLAN:      ICD-10-CM    1. Arthralgia of right temporomandibular joint M26.621 naproxen (NAPROSYN) 500 MG tablet       Patient Instructions   Naprosyn 500 mg twice a day with food.  Ice    Call or return to clinic if symptoms worsen or fail to improve as anticipated.      tmj handouts given & discussed      Lora Arias MD

## 2017-12-24 ENCOUNTER — HEALTH MAINTENANCE LETTER (OUTPATIENT)
Age: 22
End: 2017-12-24

## 2018-06-28 ENCOUNTER — OFFICE VISIT (OUTPATIENT)
Dept: URGENT CARE | Facility: URGENT CARE | Age: 23
End: 2018-06-28
Payer: COMMERCIAL

## 2018-06-28 VITALS
TEMPERATURE: 97.5 F | HEART RATE: 68 BPM | DIASTOLIC BLOOD PRESSURE: 58 MMHG | SYSTOLIC BLOOD PRESSURE: 108 MMHG | OXYGEN SATURATION: 98 %

## 2018-06-28 DIAGNOSIS — R10.9 RIGHT FLANK PAIN: ICD-10-CM

## 2018-06-28 DIAGNOSIS — N39.0 RECURRENT UTI: ICD-10-CM

## 2018-06-28 DIAGNOSIS — Z87.448 H/O PYELONEPHRITIS: ICD-10-CM

## 2018-06-28 DIAGNOSIS — N10 ACUTE PYELONEPHRITIS: Primary | ICD-10-CM

## 2018-06-28 DIAGNOSIS — R30.0 DYSURIA: ICD-10-CM

## 2018-06-28 DIAGNOSIS — R82.90 NONSPECIFIC FINDING ON EXAMINATION OF URINE: ICD-10-CM

## 2018-06-28 LAB
ALBUMIN UR-MCNC: ABNORMAL MG/DL
ANION GAP SERPL CALCULATED.3IONS-SCNC: 2 MMOL/L (ref 3–14)
APPEARANCE UR: ABNORMAL
BACTERIA #/AREA URNS HPF: ABNORMAL /HPF
BASOPHILS # BLD AUTO: 0 10E9/L (ref 0–0.2)
BASOPHILS NFR BLD AUTO: 0.3 %
BILIRUB UR QL STRIP: NEGATIVE
BUN SERPL-MCNC: 9 MG/DL (ref 7–30)
CALCIUM SERPL-MCNC: 8.9 MG/DL (ref 8.5–10.1)
CHLORIDE SERPL-SCNC: 106 MMOL/L (ref 94–109)
CO2 SERPL-SCNC: 30 MMOL/L (ref 20–32)
COLOR UR AUTO: YELLOW
CREAT SERPL-MCNC: 0.6 MG/DL (ref 0.52–1.04)
DIFFERENTIAL METHOD BLD: ABNORMAL
EOSINOPHIL # BLD AUTO: 0.1 10E9/L (ref 0–0.7)
EOSINOPHIL NFR BLD AUTO: 0.8 %
ERYTHROCYTE [DISTWIDTH] IN BLOOD BY AUTOMATED COUNT: 11.7 % (ref 10–15)
GFR SERPL CREATININE-BSD FRML MDRD: >90 ML/MIN/1.7M2
GLUCOSE SERPL-MCNC: 79 MG/DL (ref 70–99)
GLUCOSE UR STRIP-MCNC: NEGATIVE MG/DL
HCT VFR BLD AUTO: 39.6 % (ref 35–47)
HGB BLD-MCNC: 13.4 G/DL (ref 11.7–15.7)
HGB UR QL STRIP: ABNORMAL
KETONES UR STRIP-MCNC: NEGATIVE MG/DL
LEUKOCYTE ESTERASE UR QL STRIP: ABNORMAL
LYMPHOCYTES # BLD AUTO: 1.8 10E9/L (ref 0.8–5.3)
LYMPHOCYTES NFR BLD AUTO: 15 %
MCH RBC QN AUTO: 30.2 PG (ref 26.5–33)
MCHC RBC AUTO-ENTMCNC: 33.8 G/DL (ref 31.5–36.5)
MCV RBC AUTO: 89 FL (ref 78–100)
MONOCYTES # BLD AUTO: 0.9 10E9/L (ref 0–1.3)
MONOCYTES NFR BLD AUTO: 7.7 %
NEUTROPHILS # BLD AUTO: 9.1 10E9/L (ref 1.6–8.3)
NEUTROPHILS NFR BLD AUTO: 76.2 %
NITRATE UR QL: NEGATIVE
NON-SQ EPI CELLS #/AREA URNS LPF: ABNORMAL /LPF
PH UR STRIP: 6.5 PH (ref 5–7)
PLATELET # BLD AUTO: 238 10E9/L (ref 150–450)
POTASSIUM SERPL-SCNC: 4.1 MMOL/L (ref 3.4–5.3)
RBC # BLD AUTO: 4.44 10E12/L (ref 3.8–5.2)
RBC #/AREA URNS AUTO: ABNORMAL /HPF
SODIUM SERPL-SCNC: 138 MMOL/L (ref 133–144)
SOURCE: ABNORMAL
SP GR UR STRIP: 1.02 (ref 1–1.03)
SPECIMEN SOURCE: NORMAL
UROBILINOGEN UR STRIP-ACNC: 0.2 EU/DL (ref 0.2–1)
WBC # BLD AUTO: 11.9 10E9/L (ref 4–11)
WBC #/AREA URNS AUTO: ABNORMAL /HPF
WET PREP SPEC: NORMAL

## 2018-06-28 PROCEDURE — 87086 URINE CULTURE/COLONY COUNT: CPT | Performed by: PHYSICIAN ASSISTANT

## 2018-06-28 PROCEDURE — 87088 URINE BACTERIA CULTURE: CPT | Performed by: PHYSICIAN ASSISTANT

## 2018-06-28 PROCEDURE — 36415 COLL VENOUS BLD VENIPUNCTURE: CPT | Performed by: PHYSICIAN ASSISTANT

## 2018-06-28 PROCEDURE — 80048 BASIC METABOLIC PNL TOTAL CA: CPT | Performed by: PHYSICIAN ASSISTANT

## 2018-06-28 PROCEDURE — 87210 SMEAR WET MOUNT SALINE/INK: CPT | Performed by: PHYSICIAN ASSISTANT

## 2018-06-28 PROCEDURE — 99214 OFFICE O/P EST MOD 30 MIN: CPT | Performed by: PHYSICIAN ASSISTANT

## 2018-06-28 PROCEDURE — 85025 COMPLETE CBC W/AUTO DIFF WBC: CPT | Performed by: PHYSICIAN ASSISTANT

## 2018-06-28 PROCEDURE — 81001 URINALYSIS AUTO W/SCOPE: CPT | Performed by: PHYSICIAN ASSISTANT

## 2018-06-28 PROCEDURE — 87186 SC STD MICRODIL/AGAR DIL: CPT | Performed by: PHYSICIAN ASSISTANT

## 2018-06-28 RX ORDER — CEFTRIAXONE 1 G/1
1000 INJECTION, POWDER, FOR SOLUTION INTRAMUSCULAR; INTRAVENOUS ONCE
Qty: 10 ML | Refills: 0 | OUTPATIENT
Start: 2018-06-28 | End: 2018-06-28

## 2018-06-28 RX ORDER — CIPROFLOXACIN 500 MG/1
500 TABLET, FILM COATED ORAL 2 TIMES DAILY
Qty: 14 TABLET | Refills: 0 | Status: SHIPPED | OUTPATIENT
Start: 2018-06-28 | End: 2018-07-09

## 2018-06-28 NOTE — PROGRESS NOTES
SUBJECTIVE:  Yolande Yadav is a 22 year old female who presents today with dysuria. This has been present for the past 8 days and is associated with vulvovaginal itchiness and discomfort, and 2/10 right sided flank pain. The patient reports it is normal for her to have some vulvovaginal pain and itchiness after her period, LMP 6/14, but normally it resolves after a day or two with the help of hydrocortisone cream. This time her symptoms have not resolved. Denies new sexual partners, new genital lesions, or new discharge. No fevers or chills. No nausea, vomiting, or diarrhea. Patient has a female partner.     Further interviewing revealed patient has a significant past urologic history. Reports history of UTIs 2-3 times per year and history of them progressing into kidney infections. In childhood she had recurrent UTIs and was hospitalized for this problem. Reports she saw a urologist at that time and was given a device to help strengthen her pelvic muscles. In 2017, patient was hospitalized twice for pyelonephritis, once in March and again in November. Mother has a history of kidney stones, patient denies personal history of kidney stones.     Past Medical History:   Diagnosis Date     Pyelonephritis     recurrent as child     Current Outpatient Prescriptions   Medication Sig Dispense Refill     ACETAMINOPHEN PO        albuterol (PROAIR HFA, PROVENTIL HFA, VENTOLIN HFA) 108 (90 BASE) MCG/ACT inhaler Inhale 2 puffs into the lungs every 6 hours       ALPRAZolam (XANAX) 0.25 MG tablet Take 0.25 mg by mouth as needed for anxiety       fluticasone (FLONASE) 50 MCG/ACT spray Spray 1 spray into both nostrils daily       IBUPROFEN PO        naproxen (NAPROSYN) 500 MG tablet Take 1 tablet (500 mg) by mouth 2 times daily (with meals) 60 tablet 0     amoxicillin-clavulanate (AUGMENTIN) 875-125 MG per tablet Take 1 tablet by mouth 2 times daily (Patient not taking: Reported on 6/28/2018) 28 tablet 0     cefuroxime (CEFTIN) 500  MG tablet Take 1 tablet (500 mg) by mouth 2 times daily (Patient not taking: Reported on 6/28/2018) 20 tablet 0     citalopram (CELEXA) 20 MG tablet Take 20 mg by mouth daily       UNKNOWN TO PATIENT Allergy tablet daily.       Social History   Substance Use Topics     Smoking status: Never Smoker     Smokeless tobacco: Never Used     Alcohol use Not on file       ROS:   CONSTITUTIONAL:NEGATIVE for fever, chills, change in weight  INTEGUMENTARY/SKIN: NEGATIVE for worrisome rashes, moles or lesions  ENT/MOUTH: NEGATIVE for ear, mouth and throat problems  RESP:NEGATIVE for significant cough or SOB  CV: NEGATIVE for chest pain, palpitations or peripheral edema  GI: NEGATIVE for nausea, abdominal pain, heartburn, or change in bowel habits  : POSITIVE for dysuria, vulvovaginal itchiness, and mild right flank pain  MUSCULOSKELETAL: NEGATIVE for significant arthralgias or myalgia  NEURO: NEGATIVE for weakness, dizziness or paresthesias  PSYCHIATRIC: NEGATIVE for changes in mood or affect    OBJECTIVE:  /58 (BP Location: Right arm, Patient Position: Chair, Cuff Size: Adult Regular)  Pulse 68  Temp 97.5  F (36.4  C) (Tympanic)  SpO2 98%  GENERAL APPEARANCE: healthy, alert and no distress  RESP: lungs clear to auscultation - no rales, rhonchi or wheezes  CV: regular rates and rhythm, normal S1 S2, no murmur noted  ABDOMEN:  soft, nontender, no HSM or masses and bowel sounds normal  BACK: No CVA tenderness on the left, mild CVA tenderness on the right   SKIN: no suspicious lesions or rashes  PSYCH: mentation appears normal and affect normal/bright    Results for orders placed or performed in visit on 06/28/18   UA reflex to Microscopic and Culture   Result Value Ref Range    Color Urine Yellow     Appearance Urine Slightly Cloudy     Glucose Urine Negative NEG^Negative mg/dL    Bilirubin Urine Negative NEG^Negative    Ketones Urine Negative NEG^Negative mg/dL    Specific Gravity Urine 1.025 1.003 - 1.035    Blood  Urine Small (A) NEG^Negative    pH Urine 6.5 5.0 - 7.0 pH    Protein Albumin Urine Trace (A) NEG^Negative mg/dL    Urobilinogen Urine 0.2 0.2 - 1.0 EU/dL    Nitrite Urine Negative NEG^Negative    Leukocyte Esterase Urine Moderate (A) NEG^Negative    Source Midstream Urine    Urine Microscopic   Result Value Ref Range    WBC Urine  (A) OTO5^0 - 5 /HPF    RBC Urine 5-10 (A) OTO2^O - 2 /HPF    Squamous Epithelial /LPF Urine Many (A) FEW^Few /LPF    Bacteria Urine Many (A) NEG^Negative /HPF   CBC with platelets differential   Result Value Ref Range    WBC 11.9 (H) 4.0 - 11.0 10e9/L    RBC Count 4.44 3.8 - 5.2 10e12/L    Hemoglobin 13.4 11.7 - 15.7 g/dL    Hematocrit 39.6 35.0 - 47.0 %    MCV 89 78 - 100 fl    MCH 30.2 26.5 - 33.0 pg    MCHC 33.8 31.5 - 36.5 g/dL    RDW 11.7 10.0 - 15.0 %    Platelet Count 238 150 - 450 10e9/L    Diff Method Automated Method     % Neutrophils 76.2 %    % Lymphocytes 15.0 %    % Monocytes 7.7 %    % Eosinophils 0.8 %    % Basophils 0.3 %    Absolute Neutrophil 9.1 (H) 1.6 - 8.3 10e9/L    Absolute Lymphocytes 1.8 0.8 - 5.3 10e9/L    Absolute Monocytes 0.9 0.0 - 1.3 10e9/L    Absolute Eosinophils 0.1 0.0 - 0.7 10e9/L    Absolute Basophils 0.0 0.0 - 0.2 10e9/L   Basic metabolic panel  (Ca, Cl, CO2, Creat, Gluc, K, Na, BUN)   Result Value Ref Range    Sodium 138 133 - 144 mmol/L    Potassium 4.1 3.4 - 5.3 mmol/L    Chloride 106 94 - 109 mmol/L    Carbon Dioxide 30 20 - 32 mmol/L    Anion Gap 2 (L) 3 - 14 mmol/L    Glucose 79 70 - 99 mg/dL    Urea Nitrogen 9 7 - 30 mg/dL    Creatinine 0.60 0.52 - 1.04 mg/dL    GFR Estimate >90 >60 mL/min/1.7m2    GFR Estimate If Black >90 >60 mL/min/1.7m2    Calcium 8.9 8.5 - 10.1 mg/dL   Wet prep   Result Value Ref Range    Specimen Description Vagina     Wet Prep No clue cells seen     Wet Prep No yeast seen     Wet Prep No Trichomonas seen        ASSESSMENT:   22 year old female with history of UTIs and pyelonephritis presenting with 8 day history of  dysuria and vulvovaginal discomfort. Wet prep negative. UA remarkable for moderate leukocyte esterase, many bacteria, and many WBCs. Given patient's history of pyelonephritis and mild CVA tenderness on the right, further lab workup was required. CBC revealed mildly elevated WBC to 11.9 with left shift. BMP normal. Given patient's clinical presentation and workup her assessment is most consistent with early pyelonephritis.      I, Ravindra Rodriguez PA-C, agree with above student report.     My personal, additional MDM comments: Acute onset right flank pain, dysuria and UTI sxs in woman with extensive PMHX of recurrent UTIs and 2 recent hospitalizations for pyelonephritis within past one year. Positive right CVA tenderness on exam with percussion. Significant pyuria on UA. Mildly elevated WBC with left shift. Above acute flank pain with pyuria meets diagnostic criteria for pyelonephritis. Elevated WBC with left shift furthers corroborates dx.  Patient gives hx of prior IP pyelonephritis episodes starting with fairly mild to no UTI sxs other than cloudy or foul smelling urine. Given all above, I advised Rocephin 1 gram IM here in UC for beta lactam coverage and Cipro 500 mg bid x 7 days PO for gram negative coverage pending UCX in hopes this will prevent worsening/prefent need for ER or IP treatment.  Patient does not appear toxic. NAD. She actually appears quite well. No evidence for urosepsis here today. Of note, patient's BP noted to be low today. Upon speaking with patient and upon review of prior VS on file in Epic, it is confirmed this is within her typical baseline BP range. Patient has been educated about red flag signs and sxs that should prompt her to seek immediate ER evaluation (both verbally and in printed form). I have also advised she be seen next week by PCP for re-evaluation and urine recheck to assure full resolution of her infection/iven her urologic pmhx, want to assure full resolution.     PLAN:  1)  IM dose of Rocephin   2) Ciprofloxacin 500 mg BID x 7 days   3) Follow up with primary care provider next week for recheck and to make sure the infection is fully cleared.   4) If you develop decreased urine output or trouble urinating, severe pain in the lower back or flank, fevers of 100.4 or higher, shaking chills, vomiting, blood in urine, dark colored or foul smelling urine, or nausea please present in the ED immediately for further evaluation.     Samson Aggarwal  PA-S2    I have reviewed the ROS and PFSH documented by the student. I performed thge pertinent history, exam and assessment/plan components as documented above.

## 2018-06-28 NOTE — MR AVS SNAPSHOT
After Visit Summary   6/28/2018    Yolande Yadav    MRN: [id number]           Patient Information     Date Of Birth          1995        Visit Information        Provider Department      6/28/2018 10:20 AM Ravindra Hankins PA-C [facility]        Today's Diagnoses     Acute pyelonephritis    -  1    Dysuria        Nonspecific finding on examination of urine        H/O pyelonephritis        Recurrent UTI        Right flank pain          Care Instructions      Discharge Instructions for Pyelonephritis  You have been told you have a kidney infection. This is called pyelonephritis. The infection can be serious. It can damage your kidneys and cause bacteria to enter your bloodstream. You were treated in the hospital. Once you return home, here s what you can do at home to aid in your recovery and prevent future infections.  Home care    Take all the medicine you were prescribed, even if you feel better. Not finishing the medicine can make the infection come back. It may also make a future infection harder to treat.    Unless told not to by your healthcare provider, drink 8 to 12 glasses of fluid every day. Clear fluids, such as water, are best. This may help flush the infection from your system.  Preventing future infection    Keep your genital area clean. Use mild soap. Rinse with water.    If you are a woman, always wipe the genital area from front to back.    Urinate frequently. Avoid holding urine in the bladder for a long time.    Always urinate after sexual intercourse.  Follow-up care  Follow up with your healthcare provider, or as advised. And see your healthcare provider for regular lab tests as directed.     Follow-up immediately in the emergency room if:  Call your healthcare provider right away if you have any of the following:    Decreased urine output or trouble urinating    Severe pain in the lower back or flank    Fever of 100.4 F (38 C) or higher, or as  directed by your healthcare provider    Shaking chills    Vomiting    Blood in your urine    Dark-colored or foul-smelling urine    Nausea or other problems that prevent you from taking your prescribed medicine   Date Last Reviewed: 2/1/2017 2000-2017 [employer]. [address] [place] [address]. All rights reserved. This information is not intended as a substitute for professional medical care. Always follow your healthcare professional's instructions.    Date 6/28/18 visit in Urgent Care plan   1) You were given an injection of a strong antibiotic Rocephin in the muscle today  2) Please start the by mouth antibiotic Ciprofloxacin 500 mg twice daily for 7 days. You can take two doses yet today.   3) As we discussed, due to your history of frequent kidney infections and hospitalizations, you should be seen by your primary care provider next week for a recheck and to make sure this infection is fully cleared.  4) If you develop any of the above symptoms, go directly to the emergency room.                 Follow-ups after your visit        Who to contact     If you have questions or need follow up information about today's clinic visit or your schedule please contact [facility] directly at [phone].  Normal or non-critical lab and imaging results will be communicated to you by [employer], letter or phone within 4 business days after the clinic has received the results. If you do not hear from us within 7 days, please contact the clinic through [employer] or phone. If you have a critical or abnormal lab result, we will notify you by phone as soon as possible.  Submit refill requests through [employer] or call your pharmacy and they will forward the refill request to us. Please allow 3 business days for your refill to be completed.          Additional Information About Your Visit        [employer] Information     [employer] gives you secure access to your electronic health record. If you  see a primary care provider, you can also send messages to your care team and make appointments. If you have questions, please call your primary care clinic.  If you do not have a primary care provider, please call [phone] and they will assist you.        Care EveryWhere ID     This is your Care EveryWhere ID. This could be used by other organizations to access your [facility] medical records  [id number]        Your Vitals Were     Pulse Temperature Pulse Oximetry             68 97.5  F (36.4  C) (Tympanic) 98%          Blood Pressure from Last 3 Encounters:   06/28/18 108/58   09/25/17 111/63   08/26/17 101/59    Weight from Last 3 Encounters:   09/25/17 184 lb (83.5 kg)   08/26/17 184 lb (83.5 kg)   06/14/17 191 lb 3 oz (86.7 kg)              We Performed the Following     Basic metabolic panel  (Ca, Cl, CO2, Creat, Gluc, K, Na, BUN)     CBC with platelets differential     UA reflex to Microscopic and Culture     Urine Culture Aerobic Bacterial     Urine Microscopic     Wet prep          Today's Medication Changes          These changes are accurate as of 6/28/18 12:03 PM.  If you have any questions, ask your nurse or doctor.               Start taking these medicines.        Dose/Directions    cefTRIAXone 1 GM vial   Commonly known as:  ROCEPHIN   Used for:  Acute pyelonephritis        Dose:  1000 mg   Inject 1 g (1,000 mg) into the muscle once for 1 dose   Quantity:  10 mL   Refills:  0       ciprofloxacin 500 MG tablet   Commonly known as:  CIPRO   Used for:  Acute pyelonephritis        Dose:  500 mg   Take 1 tablet (500 mg) by mouth 2 times daily   Quantity:  14 tablet   Refills:  0            Where to get your medicines      These medications were sent to [id number] - [place], [place] - [address] AT [facility] [address] [place] [address]    Hours:  24-hours Phone:  [phone]     ciprofloxacin 500 MG tablet         Some of these will need a paper  prescription and others can be bought over the counter.  Ask your nurse if you have questions.     You don't need a prescription for these medications     cefTRIAXone 1 GM vial                Primary Care Provider Fax #    Physician No Ref-Primary [phone]       No address on file        Equal Access to Services     KATHLEEN ANN : Carlos naomi meeks susan Krishna, wapatricioda luqadaha, qaallisonta kaalmada apurva, darshana caraballo bettyelizabeth williamson lianne crisostomo. So [facility] [phone].    ATENCIÓN: Si habla español, tiene a young disposición servicios gratuitos de asistencia lingüística. Llame al [phone].    We comply with applicable federal civil rights laws and [place] laws. We do not discriminate on the basis of race, color, national origin, age, disability, sex, sexual orientation, or gender identity.            Thank you!     Thank you for choosing [facility]  for your care. Our goal is always to provide you with excellent care. Hearing back from our patients is one way we can continue to improve our services. Please take a few minutes to complete the written survey that you may receive in the mail after your visit with us. Thank you!             Your Updated Medication List - Protect others around you: Learn how to safely use, store and throw away your medicines at www.disposemymeds.org.          This list is accurate as of 6/28/18 12:03 PM.  Always use your most recent med list.                   Brand Name Dispense Instructions for use Diagnosis    ACETAMINOPHEN PO           albuterol 108 (90 Base) MCG/ACT Inhaler    PROAIR HFA/PROVENTIL HFA/VENTOLIN HFA     Inhale 2 puffs into the lungs every 6 hours        ALPRAZolam 0.25 MG tablet    XANAX     Take 0.25 mg by mouth as needed for anxiety        amoxicillin-clavulanate 875-125 MG per tablet    AUGMENTIN    28 tablet    Take 1 tablet by mouth 2 times daily    Cellulitis of other specified site       cefTRIAXone 1 GM vial    ROCEPHIN    10 mL    Inject  1 g (1,000 mg) into the muscle once for 1 dose    Acute pyelonephritis       cefuroxime 500 MG tablet    CEFTIN    20 tablet    Take 1 tablet (500 mg) by mouth 2 times daily    Acute sinusitis with symptoms > 10 days       ciprofloxacin 500 MG tablet    CIPRO    14 tablet    Take 1 tablet (500 mg) by mouth 2 times daily    Acute pyelonephritis       citalopram 20 MG tablet    celeXA     Take 20 mg by mouth daily        fluticasone 50 MCG/ACT spray    FLONASE     Spray 1 spray into both nostrils daily        IBUPROFEN PO           naproxen 500 MG tablet    NAPROSYN    60 tablet    Take 1 tablet (500 mg) by mouth 2 times daily (with meals)    Arthralgia of right temporomandibular joint       UNKNOWN TO PATIENT      Allergy tablet daily.

## 2018-06-28 NOTE — PATIENT INSTRUCTIONS
Discharge Instructions for Pyelonephritis  You have been told you have a kidney infection. This is called pyelonephritis. The infection can be serious. It can damage your kidneys and cause bacteria to enter your bloodstream. You were treated in the hospital. Once you return home, here s what you can do at home to aid in your recovery and prevent future infections.  Home care    Take all the medicine you were prescribed, even if you feel better. Not finishing the medicine can make the infection come back. It may also make a future infection harder to treat.    Unless told not to by your healthcare provider, drink 8 to 12 glasses of fluid every day. Clear fluids, such as water, are best. This may help flush the infection from your system.  Preventing future infection    Keep your genital area clean. Use mild soap. Rinse with water.    If you are a woman, always wipe the genital area from front to back.    Urinate frequently. Avoid holding urine in the bladder for a long time.    Always urinate after sexual intercourse.  Follow-up care  Follow up with your healthcare provider, or as advised. And see your healthcare provider for regular lab tests as directed.     Follow-up immediately in the emergency room if:  Call your healthcare provider right away if you have any of the following:    Decreased urine output or trouble urinating    Severe pain in the lower back or flank    Fever of 100.4 F (38 C) or higher, or as directed by your healthcare provider    Shaking chills    Vomiting    Blood in your urine    Dark-colored or foul-smelling urine    Nausea or other problems that prevent you from taking your prescribed medicine   Date Last Reviewed: 2/1/2017 2000-2017 [employer]. [address] [place] [address], [place] [address]. All rights reserved. This information is not intended as a substitute for professional medical care. Always follow your healthcare professional's instructions.    Date 6/28/18 visit in  Urgent Care plan   1) You were given an injection of a strong antibiotic Rocephin in the muscle today  2) Please start the by mouth antibiotic Ciprofloxacin 500 mg twice daily for 7 days. You can take two doses yet today.   3) As we discussed, due to your history of frequent kidney infections and hospitalizations, you should be seen by your primary care provider next week for a recheck and to make sure this infection is fully cleared.  4) If you develop any of the above symptoms, go directly to the emergency room.

## 2018-06-28 NOTE — NURSING NOTE
Patient given 1G rocephin per provider order.   Due to injection administration, patient instructed to remain in clinic for 15 minutes  afterwards, and to report any adverse reaction to me immediately.  Nahun Busby, Medical Assistant

## 2018-06-30 LAB
BACTERIA SPEC CULT: ABNORMAL
SPECIMEN SOURCE: ABNORMAL

## 2018-07-09 ENCOUNTER — OFFICE VISIT (OUTPATIENT)
Dept: PEDIATRICS | Facility: CLINIC | Age: 23
End: 2018-07-09
Payer: COMMERCIAL

## 2018-07-09 VITALS
TEMPERATURE: 97.9 F | SYSTOLIC BLOOD PRESSURE: 108 MMHG | DIASTOLIC BLOOD PRESSURE: 64 MMHG | HEIGHT: 63 IN | WEIGHT: 190.7 LBS | BODY MASS INDEX: 33.79 KG/M2 | HEART RATE: 79 BPM | OXYGEN SATURATION: 97 %

## 2018-07-09 DIAGNOSIS — Z87.448 HISTORY OF PYELONEPHRITIS: ICD-10-CM

## 2018-07-09 DIAGNOSIS — Z23 NEED FOR HPV VACCINATION: ICD-10-CM

## 2018-07-09 DIAGNOSIS — N39.0 RECURRENT UTI: Primary | ICD-10-CM

## 2018-07-09 DIAGNOSIS — F41.9 ANXIETY: ICD-10-CM

## 2018-07-09 PROBLEM — E66.811 CLASS 1 OBESITY DUE TO EXCESS CALORIES WITHOUT SERIOUS COMORBIDITY WITH BODY MASS INDEX (BMI) OF 33.0 TO 33.9 IN ADULT: Status: ACTIVE | Noted: 2018-07-09

## 2018-07-09 PROBLEM — M25.531 RIGHT WRIST PAIN: Status: RESOLVED | Noted: 2017-02-23 | Resolved: 2018-07-09

## 2018-07-09 PROBLEM — R29.898 MECHANICAL LIMB PROBLEMS: Status: RESOLVED | Noted: 2017-02-23 | Resolved: 2018-07-09

## 2018-07-09 PROBLEM — E66.09 CLASS 1 OBESITY DUE TO EXCESS CALORIES WITHOUT SERIOUS COMORBIDITY WITH BODY MASS INDEX (BMI) OF 33.0 TO 33.9 IN ADULT: Status: ACTIVE | Noted: 2018-07-09

## 2018-07-09 LAB
ALBUMIN UR-MCNC: NEGATIVE MG/DL
APPEARANCE UR: CLEAR
BACTERIA #/AREA URNS HPF: ABNORMAL /HPF
BILIRUB UR QL STRIP: NEGATIVE
COLOR UR AUTO: YELLOW
GLUCOSE UR STRIP-MCNC: NEGATIVE MG/DL
HGB UR QL STRIP: NEGATIVE
KETONES UR STRIP-MCNC: NEGATIVE MG/DL
LEUKOCYTE ESTERASE UR QL STRIP: ABNORMAL
NITRATE UR QL: NEGATIVE
NON-SQ EPI CELLS #/AREA URNS LPF: ABNORMAL /LPF
PH UR STRIP: 5.5 PH (ref 5–7)
RBC #/AREA URNS AUTO: ABNORMAL /HPF
SOURCE: ABNORMAL
SP GR UR STRIP: 1.02 (ref 1–1.03)
UROBILINOGEN UR STRIP-ACNC: 0.2 EU/DL (ref 0.2–1)
WBC #/AREA URNS AUTO: ABNORMAL /HPF

## 2018-07-09 PROCEDURE — 90651 9VHPV VACCINE 2/3 DOSE IM: CPT | Performed by: INTERNAL MEDICINE

## 2018-07-09 PROCEDURE — 99214 OFFICE O/P EST MOD 30 MIN: CPT | Mod: 25 | Performed by: INTERNAL MEDICINE

## 2018-07-09 PROCEDURE — 81001 URINALYSIS AUTO W/SCOPE: CPT | Performed by: INTERNAL MEDICINE

## 2018-07-09 PROCEDURE — 87086 URINE CULTURE/COLONY COUNT: CPT | Performed by: INTERNAL MEDICINE

## 2018-07-09 PROCEDURE — 90471 IMMUNIZATION ADMIN: CPT | Performed by: INTERNAL MEDICINE

## 2018-07-09 ASSESSMENT — PATIENT HEALTH QUESTIONNAIRE - PHQ9: 5. POOR APPETITE OR OVEREATING: MORE THAN HALF THE DAYS

## 2018-07-09 ASSESSMENT — ANXIETY QUESTIONNAIRES
3. WORRYING TOO MUCH ABOUT DIFFERENT THINGS: SEVERAL DAYS
GAD7 TOTAL SCORE: 9
5. BEING SO RESTLESS THAT IT IS HARD TO SIT STILL: MORE THAN HALF THE DAYS
6. BECOMING EASILY ANNOYED OR IRRITABLE: SEVERAL DAYS
1. FEELING NERVOUS, ANXIOUS, OR ON EDGE: MORE THAN HALF THE DAYS
7. FEELING AFRAID AS IF SOMETHING AWFUL MIGHT HAPPEN: NOT AT ALL
IF YOU CHECKED OFF ANY PROBLEMS ON THIS QUESTIONNAIRE, HOW DIFFICULT HAVE THESE PROBLEMS MADE IT FOR YOU TO DO YOUR WORK, TAKE CARE OF THINGS AT HOME, OR GET ALONG WITH OTHER PEOPLE: VERY DIFFICULT
2. NOT BEING ABLE TO STOP OR CONTROL WORRYING: SEVERAL DAYS

## 2018-07-09 NOTE — NURSING NOTE
Screening Questionnaire for Adult Immunization    Are you sick today?   No   Do you have allergies to medications, food, a vaccine component or latex?   No   Have you ever had a serious reaction after receiving a vaccination?   No   Do you have a long-term health problem with heart disease, lung disease, asthma, kidney disease, metabolic disease (e.g. diabetes), anemia, or other blood disorder?   No   Do you have cancer, leukemia, HIV/AIDS, or any other immune system problem?   No   In the past 3 months, have you taken medications that affect  your immune system, such as prednisone, other steroids, or anticancer drugs; drugs for the treatment of rheumatoid arthritis, Crohn s disease, or psoriasis; or have you had radiation treatments?   No   Have you had a seizure, or a brain or other nervous system problem?   No   During the past year, have you received a transfusion of blood or blood     products, or been given immune (gamma) globulin or antiviral drug?   No   For women: Are you pregnant or is there a chance you could become        pregnant during the next month?   No   Have you received any vaccinations in the past 4 weeks?   No     Immunization questionnaire answers were all negative.        Per orders of Dr. Juárez, injection of HPV given by Dania Hartman. Patient instructed to remain in clinic for 15 minutes afterwards, and to report any adverse reaction to me immediately.       Screening performed by Dania Hartman on 7/9/2018 at 11:47 AM.

## 2018-07-09 NOTE — MR AVS SNAPSHOT
After Visit Summary   7/9/2018    Yolande Yadav    MRN: [id number]           Patient Information     Date Of Birth          1995        Visit Information        Provider Department      7/9/2018 11:10 AM Samson Juárez MD [facility]        Today's Diagnoses     Recurrent UTI    -  1      Care Instructions    Nice to meet you today!    We'll send your urine for culture. I'll be in touch via MyChart.    Urology referral placed - I think it's smart to see them given that you had so many UTIs as a kid and they frequently turn into pyelo.     In the meantime-   - lots of fluids  - go to the bathroom frequently   - pee after intercourse    Keep an eye on your mental health - let me know if things get harder.    Come back to see me this fall for a Wellness/Physical (or one year after your last one).           Follow-ups after your visit        Additional Services     UROLOGY ADULT REFERRAL       Your provider has referred you to: UMP: [facility] [phone]   [email or web address]  FHN: [facility]  [place]  [phone]   [email or web address]    Please be aware that coverage of these services is subject to the terms and limitations of your health insurance plan.  Call member services at your health plan with any benefit or coverage questions.      Please bring the following with you to your appointment:    (1) Any X-Rays, CTs or MRIs which have been performed.  Contact the facility where they were done to arrange for  prior to your scheduled appointment.    (2) List of current medications  (3) This referral request   (4) Any documents/labs given to you for this referral                  Follow-up notes from your care team     Return in about 3 months (around 10/9/2018) for Wellness Visit.      Who to contact     If you have questions or need follow up information about  "today's clinic visit or your schedule please contact [facility] directly at [phone].  Normal or non-critical lab and imaging results will be communicated to you by MyChart, letter or phone within 4 business days after the clinic has received the results. If you do not hear from us within 7 days, please contact the clinic through [employer] or phone. If you have a critical or abnormal lab result, we will notify you by phone as soon as possible.  Submit refill requests through [employer] or call your pharmacy and they will forward the refill request to us. Please allow 3 business days for your refill to be completed.          Additional Information About Your Visit        [employer] Information     [employer] gives you secure access to your electronic health record. If you see a primary care provider, you can also send messages to your care team and make appointments. If you have questions, please call your primary care clinic.  If you do not have a primary care provider, please call [phone] and they will assist you.        Care EveryWhere ID     This is your Care EveryWhere ID. This could be used by other organizations to access your [place] medical records  [id number]        Your Vitals Were     Pulse Temperature Height Pulse Oximetry Breastfeeding? BMI (Body Mass Index)    79 97.9  F (36.6  C) 5' 3\" (1.6 m) 97% No 33.78 kg/m2       Blood Pressure from Last 3 Encounters:   07/09/18 108/64   06/28/18 108/58   09/25/17 111/63    Weight from Last 3 Encounters:   07/09/18 190 lb 11.2 oz (86.5 kg)   09/25/17 184 lb (83.5 kg)   08/26/17 184 lb (83.5 kg)              We Performed the Following     UA reflex to Microscopic and Culture     Urine Culture Aerobic Bacterial     Urine Microscopic     UROLOGY ADULT REFERRAL        Primary Care Provider Fax #    Physician No Ref-Primary [phone]       No address on file        Equal Access to Services     KATHLEEN SHAH: zuleyma Gonzalez " randall adria kayladarshana fisher. So [facility] [phone].    ATENCIÓN: Si mumtaz dewitt, tiene a young disposición servicios gratuitos de asistencia lingüística. Adenike al [phone].    We comply with applicable federal civil rights laws and [place] laws. We do not discriminate on the basis of race, color, national origin, age, disability, sex, sexual orientation, or gender identity.            Thank you!     Thank you for choosing [facility]  for your care. Our goal is always to provide you with excellent care. Hearing back from our patients is one way we can continue to improve our services. Please take a few minutes to complete the written survey that you may receive in the mail after your visit with us. Thank you!             Your Updated Medication List - Protect others around you: Learn how to safely use, store and throw away your medicines at www.disposemymeds.org.          This list is accurate as of 7/9/18 11:32 AM.  Always use your most recent med list.                   Brand Name Dispense Instructions for use Diagnosis    ACETAMINOPHEN PO           albuterol 108 (90 Base) MCG/ACT Inhaler    PROAIR HFA/PROVENTIL HFA/VENTOLIN HFA     Inhale 2 puffs into the lungs every 6 hours        ALPRAZolam 0.25 MG tablet    XANAX     Take 0.25 mg by mouth as needed for anxiety        citalopram 20 MG tablet    celeXA     Take 20 mg by mouth daily        fluticasone 50 MCG/ACT spray    FLONASE     Spray 1 spray into both nostrils daily        IBUPROFEN PO           naproxen 500 MG tablet    NAPROSYN    60 tablet    Take 1 tablet (500 mg) by mouth 2 times daily (with meals)    Arthralgia of right temporomandibular joint       UNKNOWN TO PATIENT      Allergy tablet daily.

## 2018-07-09 NOTE — PATIENT INSTRUCTIONS
Nice to meet you today!    We'll send your urine for culture. I'll be in touch via Night Outhart.    Urology referral placed - I think it's smart to see them given that you had so many UTIs as a kid and they frequently turn into pyelo.     In the meantime-   - lots of fluids  - go to the bathroom frequently   - pee after intercourse    Keep an eye on your mental health - let me know if things get harder.    Come back to see me this fall for a Wellness/Physical (or one year after your last one).

## 2018-07-09 NOTE — PROGRESS NOTES
SUBJECTIVE:   Yolande Yadav is a 22 year old female who presents to clinic today for the following health issues:    New Patient/Transfer of Care     Followup:  Facility:  [facility]  Date of visit: 6/28/2018  Reason for visit: UTI, Flank pain  Current Status: better, achy back still     # Depression  Diagnosed in 2015  Followed at [facility] - counselor  Took sertraline in the past - didn't feel it was helpful  Changed to prozac  Stopped everything  Still takes 0.25 ativan (?) infrequently   Thinks this is more seasonal      EMILY-7 SCORE 7/9/2018   Total Score 9     PHQ-9 SCORE 7/9/2018   Total Score 2     # UTIs  # Pyeljama[place]  Had a lot of UTIs when she was younger.  Consistently wet the bed until she was age 15... Now right around her period.   Went to a specialist, had a dye study which was normal.  Was given special exercises to strengthen her muscles - remembers squeezing a yellow ball between her legs.  She never knew she had an infection, would smell and she would wet the bed more.   Got better from age [age] until age 20.   Thinks she has had 3-4 this year.   Doesn't realize she has an infection.  No pattern with intercourse.    # Vaginal cyst removal  Had cyst removed by OB-GYN a few months ago with absorbable sutures.   Wants to make sure this is healing okay.    Chart Reviewed  # UTIs  # Pyelo  6/28/18 Urgent Care - Pyelo - rocephin + cipro x 7 days - grew 10-50K of EColi    11/2017   - UTI treated with macrobid.  - CT abd pelvis noncontrast - The kidneys  are normal in size and contour and no nephrolithiasis is seen. No hydronephrosis  or hydroureter is seen. No obstructing calculus is seen.    3/2017  Admitted to [facility] with pyelo after failing cipro as an OP. Urine culture grew pansensitive ecoli.   - CT Abd Pelvis Noncontrast - 1.  Stranding about the right kidney and ureter with mild mural thickening of the bladder, correlate for ascending UTI and pyelonephritis.  2.  No obstructing renal or  ureteral stones.    Problem list and histories reviewed & adjusted, as indicated.  Additional history: as documented    Patient Active Problem List   Diagnosis     Acute right-sided low back pain without sciatica     CARDIOVASCULAR SCREENING; LDL GOAL LESS THAN 160     History of pyelonephritis     Right wrist pain     Mechanical limb problems     Recurrent UTI     Past Surgical History:   Procedure Laterality Date     EXCIS VAGINAL CYST/TUMOR       HC TOOTH EXTRACTION W/FORCEP         Social History   Substance Use Topics     Smoking status: Never Smoker     Smokeless tobacco: Never Used     Alcohol use 0.0 oz/week     0 Standard drinks or equivalent per week      Comment: OCC     Family History   Problem Relation Age of Onset     Gallbladder Disease Mother      Obesity Mother      Hypertension Father      Obesity Father      Anxiety Disorder Brother      Diabetes Maternal Grandfather      Diabetes Paternal Grandmother          Current Outpatient Prescriptions   Medication Sig Dispense Refill     ACETAMINOPHEN PO        albuterol (PROAIR HFA, PROVENTIL HFA, VENTOLIN HFA) 108 (90 BASE) MCG/ACT inhaler Inhale 2 puffs into the lungs every 6 hours       ALPRAZolam (XANAX) 0.25 MG tablet Take 0.25 mg by mouth as needed for anxiety       fluticasone (FLONASE) 50 MCG/ACT spray Spray 1 spray into both nostrils daily       IBUPROFEN PO        naproxen (NAPROSYN) 500 MG tablet Take 1 tablet (500 mg) by mouth 2 times daily (with meals) 60 tablet 0     UNKNOWN TO PATIENT Allergy tablet daily.       citalopram (CELEXA) 20 MG tablet Take 20 mg by mouth daily       Allergies   Allergen Reactions     Seasonal Allergies        Reviewed and updated as needed this visit by clinical staff       Reviewed and updated as needed this visit by Provider         ROS:  Constitutional, HEENT, cardiovascular, pulmonary, gi and gu systems are negative, except as otherwise noted.    OBJECTIVE:     /64  Pulse 79  Temp 97.9  F (36.6  C)   "Ht 5' 3\" (1.6 m)  Wt 190 lb 11.2 oz (86.5 kg)  SpO2 97%  Breastfeeding? No  BMI 33.78 kg/m2  Body mass index is 33.78 kg/(m^2).     GENERAL: healthy, alert and no distress, overweight  EYES: Eyes grossly normal to inspection, PERRL and conjunctivae and sclerae normal  HENT: ear canals and TM's normal, nose and mouth without ulcers or lesions  NECK: no adenopathy, no asymmetry, masses, or scars and thyroid normal to palpation  RESP: lungs clear to auscultation - no rales, rhonchi or wheezes  CV: regular rate and rhythm, normal S1 S2, no S3 or S4, no murmur, click or rub, no peripheral edema and peripheral pulses strong  ABDOMEN: soft, nontender, no hepatosplenomegaly, no masses and bowel sounds normal   (female): normal female external genitalia, well healed scar just lateral to right labia majora.  MS: no gross musculoskeletal defects noted, no edema  SKIN: no suspicious lesions or rashes  BACK: no CVA tenderness, bilateral lower thoracic paraspinal tenderness.  PSYCH: mentation appears normal, affect normal/bright    Diagnostic Test Results:  Results for orders placed or performed in visit on 07/09/18 (from the past 24 hour(s))   UA reflex to Microscopic and Culture   Result Value Ref Range    Color Urine Yellow     Appearance Urine Clear     Glucose Urine Negative NEG^Negative mg/dL    Bilirubin Urine Negative NEG^Negative    Ketones Urine Negative NEG^Negative mg/dL    Specific Gravity Urine 1.025 1.003 - 1.035    Blood Urine Negative NEG^Negative    pH Urine 5.5 5.0 - 7.0 pH    Protein Albumin Urine Negative NEG^Negative mg/dL    Urobilinogen Urine 0.2 0.2 - 1.0 EU/dL    Nitrite Urine Negative NEG^Negative    Leukocyte Esterase Urine Small (A) NEG^Negative    Source Midstream Urine    Urine Microscopic   Result Value Ref Range    WBC Urine 5-10 (A) OTO5^0 - 5 /HPF    RBC Urine O - 2 OTO2^O - 2 /HPF    Squamous Epithelial /LPF Urine Few FEW^Few /LPF    Bacteria Urine Few (A) NEG^Negative /HPF "     ASSESSMENT/PLAN:     1. Recurrent UTI  2. History of pyelonephritis  Completed antibiotics. Still having a little back pain but it is superior to her CVA. Will send urine for culture.   Given her recurrent UTIs discussed daily ppx vs Urology referral. Since she has had symptoms since childhood I think a repeat eval by Urology is warranted.   - UA reflex to Microscopic and Culture  - Urine Microscopic  - UROLOGY ADULT REFERRAL  - Urine Culture Aerobic Bacterial    3. Anxiety  GAD7 is 9 today but she says this is an okay place for her. Will have her f/u in 3 months as school is starting. May need to try a different counselor or restart SSRI.    4. Need for HPV vaccination  - HC HPV VAC 9V 3 DOSE IM    She has had a pap before - will try to get records.    Patient Instructions   Nice to meet you today!    We'll send your urine for culture. I'll be in touch via Prior Knowledgehart.    Urology referral placed - I think it's smart to see them given that you had so many UTIs as a kid and they frequently turn into pyelo.     In the meantime-   - lots of fluids  - go to the bathroom frequently   - pee after intercourse    Keep an eye on your mental health - let me know if things get harder.    Come back to see me this fall for a Wellness/Physical (or one year after your last one).     Samson Juárez MD  [facility]

## 2018-07-10 LAB
BACTERIA SPEC CULT: NORMAL
SPECIMEN SOURCE: NORMAL

## 2018-07-10 ASSESSMENT — ANXIETY QUESTIONNAIRES: GAD7 TOTAL SCORE: 9

## 2018-07-10 ASSESSMENT — PATIENT HEALTH QUESTIONNAIRE - PHQ9: SUM OF ALL RESPONSES TO PHQ QUESTIONS 1-9: 2

## 2018-07-25 PROBLEM — J45.30 MILD PERSISTENT ASTHMA WITHOUT COMPLICATION: Status: ACTIVE | Noted: 2018-07-25

## 2018-07-25 PROBLEM — Z86.59 HISTORY OF DEPRESSION: Status: ACTIVE | Noted: 2018-07-25

## 2018-07-30 ENCOUNTER — TRANSFERRED RECORDS (OUTPATIENT)
Dept: HEALTH INFORMATION MANAGEMENT | Facility: CLINIC | Age: 23
End: 2018-07-30

## 2018-08-22 ENCOUNTER — TELEPHONE (OUTPATIENT)
Dept: PEDIATRICS | Facility: CLINIC | Age: 23
End: 2018-08-22

## 2018-08-22 NOTE — LETTER
September 7, 2018      Yolande Yadav  [address] [place] [address]        Dear Yolande,       We care about your health and have reviewed your health plan including your medical conditions, medications, and lab results.  Based on this review, it is recommended that you follow up regarding the following health topic(s):  -Wellness (Physical) Visit   -Chlamydia Screening    We recommend you take the following action(s):  -schedule a WELLNESS (Physical) APPOINTMENT.  We will perform the following labs: please come fasting, nothing to eat or drink except water for 10-12 hours..     Please call us at the [facility] - [phone] (or use [employer]) to address the above recommendations.     Thank you for trusting [facility] and we appreciate the opportunity to serve you.  We look forward to supporting your healthcare needs in the future.    Healthy Regards,    Your Health Care Team  [facility]

## 2018-08-22 NOTE — TELEPHONE ENCOUNTER
Panel Management Review      Patient has the following on her problem list:     Depression / Dysthymia review    Measure:  Needs PHQ-9 score of 4 or less during index window.  Administer PHQ-9 and if score is 5 or more, send encounter to provider for next steps.    5 - 7 month window range: 2    PHQ-9 SCORE 7/9/2018   Total Score 2       If PHQ-9 recheck is 5 or more, route to provider for next steps.    Patient is due for:  None    Asthma review   No flowsheet data found.   1. Is Asthma diagnosis on the Problem List? Yes    2. Is Asthma listed on Health Maintenance? No   3. Patient is due for:  none      Composite cancer screening  Chart review shows that this patient is due/due soon for the following None  Summary:    Patient is due/failing the following:   Chlamydia screening, HIV and PHYSICAL    Action needed:   Patient needs office visit.    Type of outreach:    Sent Terviu message.    Questions for provider review:    None                                                                                                                                    Dania Hartman CMA     Chart routed to self .

## 2018-09-07 NOTE — TELEPHONE ENCOUNTER
Second attempt made.  Letter was made printed and sent.  Dania Hartman CMA  September 7, 2018, 8:41 AM

## 2018-09-11 ENCOUNTER — OFFICE VISIT (OUTPATIENT)
Dept: URGENT CARE | Facility: URGENT CARE | Age: 23
End: 2018-09-11
Payer: COMMERCIAL

## 2018-09-11 VITALS
WEIGHT: 198 LBS | DIASTOLIC BLOOD PRESSURE: 88 MMHG | TEMPERATURE: 98.1 F | OXYGEN SATURATION: 100 % | BODY MASS INDEX: 35.07 KG/M2 | SYSTOLIC BLOOD PRESSURE: 136 MMHG | HEART RATE: 75 BPM

## 2018-09-11 DIAGNOSIS — J02.9 ACUTE PHARYNGITIS, UNSPECIFIED ETIOLOGY: Primary | ICD-10-CM

## 2018-09-11 DIAGNOSIS — R07.0 THROAT PAIN: ICD-10-CM

## 2018-09-11 LAB
DEPRECATED S PYO AG THROAT QL EIA: NORMAL
SPECIMEN SOURCE: NORMAL

## 2018-09-11 PROCEDURE — 87880 STREP A ASSAY W/OPTIC: CPT | Performed by: INTERNAL MEDICINE

## 2018-09-11 PROCEDURE — 87081 CULTURE SCREEN ONLY: CPT | Performed by: INTERNAL MEDICINE

## 2018-09-11 PROCEDURE — 99213 OFFICE O/P EST LOW 20 MIN: CPT | Performed by: PHYSICIAN ASSISTANT

## 2018-09-11 RX ORDER — DIPHENHYDRAMINE HYDROCHLORIDE AND LIDOCAINE HYDROCHLORIDE AND ALUMINUM HYDROXIDE AND MAGNESIUM HYDRO
5-10 KIT EVERY 6 HOURS PRN
Qty: 100 ML | Refills: 0 | Status: SHIPPED | OUTPATIENT
Start: 2018-09-11 | End: 2018-10-08

## 2018-09-11 NOTE — MR AVS SNAPSHOT
After Visit Summary   9/11/2018    Yolande Yadav    MRN: [id number]           Patient Information     Date Of Birth          1995        Visit Information        Provider Department      9/11/2018 7:25 PM Lilly Daly PA-C [facility]        Today's Diagnoses     Acute pharyngitis, unspecified etiology    -  1    Throat pain          Care Instructions    Rapid Strep test was negative.   We will run the Strep culture and if this returns positive in 24-48 hours, we will notify you and call in antibiotic prescription.  You do NOT have signs of a tonsillitis or a tonsillar abscess on your exam today.    Symptoms are likely due to viral infection that will resolve on its own in 3-7 days.    May continue with symptomatic treatments including:  -salt water gargles  -warm beverages such as tea  -throat drops  -ibuprofen or Tylenol for pain or fever  -Magic Mouthwash as needed    If fevers not coming down with Tylenol or ibuprofen, shortness of breath, not tolerating oral liquid intake, drooling, or stiff neck, return for evaluation immediately. Otherwise, if no improvement in the next week, follow up with primary care provider.            Follow-ups after your visit        Follow-up notes from your care team     Return if symptoms worsen or fail to improve.      Who to contact     If you have questions or need follow up information about today's clinic visit or your schedule please contact [facility] directly at [phone].  Normal or non-critical lab and imaging results will be communicated to you by MyChart, letter or phone within 4 business days after the clinic has received the results. If you do not hear from us within 7 days, please contact the clinic through MyChart or phone. If you have a critical or abnormal lab result, we will notify you by phone as soon as possible.  Submit refill requests through NextCloud or call your pharmacy and they  will forward the refill request to us. Please allow 3 business days for your refill to be completed.          Additional Information About Your Visit        [employer] Information     [employer] gives you secure access to your electronic health record. If you see a primary care provider, you can also send messages to your care team and make appointments. If you have questions, please call your primary care clinic.  If you do not have a primary care provider, please call [phone] and they will assist you.        Care EveryWhere ID     This is your Care EveryWhere ID. This could be used by other organizations to access your [place] medical records  [id number]        Your Vitals Were     Pulse Temperature Pulse Oximetry BMI (Body Mass Index)          75 98.1  F (36.7  C) (Tympanic) 100% 35.07 kg/m2         Blood Pressure from Last 3 Encounters:   09/11/18 136/88   07/09/18 108/64   06/28/18 108/58    Weight from Last 3 Encounters:   09/11/18 198 lb (89.8 kg)   07/09/18 190 lb 11.2 oz (86.5 kg)   09/25/17 184 lb (83.5 kg)              We Performed the Following     Beta strep group A culture     Strep, Rapid Screen          Today's Medication Changes          These changes are accurate as of 9/11/18  8:42 PM.  If you have any questions, ask your nurse or doctor.               Start taking these medicines.        Dose/Directions    magic mouthwash suspension (diphenhydrAMINE, lidocaine, aluminum-magnesium & simethicone) Susp compounding kit   Used for:  Acute pharyngitis, unspecified etiology   Started by:  Lilly Daly PA-C        Dose:  5-10 mL   Swish and swallow 5-10 mLs in mouth every 6 hours as needed for mouth sores   Quantity:  100 mL   Refills:  0            Where to get your medicines      These medications were sent to [id number] - [place], [place] - [address]ely [address]O AT [facility] [address], [place] [address]    Hours:  24-hours Phone:  [phone]      magic mouthwash suspension (diphenhydrAMINE, lidocaine, aluminum-magnesium & simethicone) Susp compounding kit                Primary Care Provider Office Phone # Fax #    Samson Samy Juárez [phone] [address] [facility] [address]        Equal Access to Services     KATHLEEN ANN : Hadii aad ku hadasho Soomaali, waaxda luqadaha, qaybta kaalmada adeegyada, waxay idiin hayaan adeeg kharash la'guillen . So [facility] [phone].    ATENCIÓN: Si habla español, tiene a young disposición servicios gratuitos de asistencia lingüística. Adenike al [phone].    We comply with applicable federal civil rights laws and [place] laws. We do not discriminate on the basis of race, color, national origin, age, disability, sex, sexual orientation, or gender identity.            Thank you!     Thank you for choosing [facility]  for your care. Our goal is always to provide you with excellent care. Hearing back from our patients is one way we can continue to improve our services. Please take a few minutes to complete the written survey that you may receive in the mail after your visit with us. Thank you!             Your Updated Medication List - Protect others around you: Learn how to safely use, store and throw away your medicines at www.disposemymeds.org.          This list is accurate as of 9/11/18  8:42 PM.  Always use your most recent med list.                   Brand Name Dispense Instructions for use Diagnosis    ACETAMINOPHEN PO           albuterol 108 (90 Base) MCG/ACT inhaler    PROAIR HFA/PROVENTIL HFA/VENTOLIN HFA     Inhale 2 puffs into the lungs every 6 hours        ALPRAZolam 0.25 MG tablet    XANAX     Take 0.25 mg by mouth as needed for anxiety        citalopram 20 MG tablet    celeXA     Take 20 mg by mouth daily        fluticasone 50 MCG/ACT spray    FLONASE     Spray 1 spray into both nostrils daily        IBUPROFEN PO           magic mouthwash suspension  (diphenhydrAMINE, lidocaine, aluminum-magnesium & simethicone) Susp compounding kit     100 mL    Swish and swallow 5-10 mLs in mouth every 6 hours as needed for mouth sores    Acute pharyngitis, unspecified etiology       naproxen 500 MG tablet    NAPROSYN    60 tablet    Take 1 tablet (500 mg) by mouth 2 times daily (with meals)    Arthralgia of right temporomandibular joint       UNKNOWN TO PATIENT      Allergy tablet daily.

## 2018-09-12 LAB
BACTERIA SPEC CULT: NORMAL
SPECIMEN SOURCE: NORMAL

## 2018-09-12 NOTE — PROGRESS NOTES
SUBJECTIVE:   Yolande Yadav is a 23 year old female presenting with a chief complaint of   Chief Complaint   Patient presents with     Urgent Care     Pt in clinic to have eval for sore throat.     Pharyngitis   .    URI Adult    Onset of symptoms was 3 day(s) ago.  Course of illness is worsening.    Severity moderately severe  Current and Associated symptoms: sore throat on the right side. Admits to painful swallowing, painful breathing on the right side. But, she IS tolerating PO intake. No fever. She is able to move her neck but admits to some pain with turning her head to the left- feels a pulling sensation on the right side of the neck. She admits to swollen glands. No cough, runny nose, nausea, vomiting, rashes. No ear pain.  Treatment measures tried include: ibuprofen with some relief  Predisposing factors include exposure to strep.  She has asthma- mild intermittent (sports induced) but denies SOB or wheezing right now.  She has already had mono.      ROS  See HPI    PMH:  Past Medical History:   Diagnosis Date     Depression 2015     Pyelonephritis     recurrent as child     Patient Active Problem List   Diagnosis     CARDIOVASCULAR SCREENING; LDL GOAL LESS THAN 160     History of pyelonephritis     Recurrent UTI     Anxiety     Class 1 obesity due to excess calories without serious comorbidity with body mass index (BMI) of 33.0 to 33.9 in adult     Mild persistent asthma without complication     History of depression         Current medications:  Current Outpatient Prescriptions   Medication Sig Dispense Refill     fluticasone (FLONASE) 50 MCG/ACT spray Spray 1 spray into both nostrils daily       magic mouthwash (FIRST-MOUTHWASH BLM) suspension Swish and swallow 5-10 mLs in mouth every 6 hours as needed for mouth sores 100 mL 0     UNKNOWN TO PATIENT Allergy tablet daily.       ACETAMINOPHEN PO        albuterol (PROAIR HFA, PROVENTIL HFA, VENTOLIN HFA) 108 (90 BASE) MCG/ACT inhaler Inhale 2 puffs into the  lungs every 6 hours       ALPRAZolam (XANAX) 0.25 MG tablet Take 0.25 mg by mouth as needed for anxiety       citalopram (CELEXA) 20 MG tablet Take 20 mg by mouth daily       IBUPROFEN PO        naproxen (NAPROSYN) 500 MG tablet Take 1 tablet (500 mg) by mouth 2 times daily (with meals) (Patient not taking: Reported on 9/11/2018) 60 tablet 0       Surgical History:  Past Surgical History:   Procedure Laterality Date     EXCIS VAGINAL CYST/TUMOR       HC TOOTH EXTRACTION W/FORCEP         Family history:  Family History   Problem Relation Age of Onset     Gallbladder Disease Mother      Obesity Mother      Hypertension Father      Obesity Father      Anxiety Disorder Brother      Diabetes Maternal Grandfather      Diabetes Paternal Grandmother          Social History:  Social History   Substance Use Topics     Smoking status: Never Smoker     Smokeless tobacco: Never Used     Alcohol use 0.0 oz/week     0 Standard drinks or equivalent per week      Comment: OCC     Grad student    OBJECTIVE  /88  Pulse 75  Temp 98.1  F (36.7  C) (Tympanic)  Wt 198 lb (89.8 kg)  SpO2 100%  BMI 35.07 kg/m2    General: alert, appears well, NAD. Afebrile.  Skin: no suspicious lesions or rashes.  HEENT: Normocephalic.   Eyes: conjunctiva clear.   Ears: TMs pearly, translucent bilaterally.   Nose: no nasal polyps. No edema of nasal mucosa. No rhinorrhea.  Oropharynx: MMM. Mild posterior pharyngeal erythema, no petechiae or exudate. No tonsillar hypertrophy. Uvula midline.    Neck: supple, no lymphadenopathy. No meningismus- neck AROM intact for flexion, extension, rotation. No thyromegaly.  Respiratory: No distress. Equal inspiration to bilateral bases. No crackles wheeze, rhonchi, rales.   Cardiovascular: RRR. No murmurs, clicks, gallups, or rub.   Psychiatric: mentation appears normal and affect normal/bright           Labs:  Results for orders placed or performed in visit on 09/11/18 (from the past 24 hour(s))   Strep, Rapid  Screen   Result Value Ref Range    Specimen Description Throat     Rapid Strep A Screen       NEGATIVE: No Group A streptococcal antigen detected by immunoassay, await culture report.             ASSESSMENT/PLAN:    ICD-10-CM    1. Acute pharyngitis, unspecified etiology J02.9 magic mouthwash (FIRST-MOUTHWASH BLM) suspension   2. Throat pain R07.0 Strep, Rapid Screen     Beta strep group A culture           Medical Decision Making:    Differential Diagnosis:  -Strep pharyngitis- negative RST  -viral pharyngitis- suspect this to be the etiology.  -tonsillitis- does NOT have tonsillitis on exam today.  -retropharyngeal abscess- highly doubt as she is well-appearing, afebrile, and is tolerating PO intake.  -neck mass or abscess- highly doubt. None seen on external inspect. No tracheal deviation or thyromegaly.      Serious Comorbid Conditions: none      Patient appears well and is tolerating oral intake. No signs of peritonsillar or retropharyngeal abscess on exam. Clear lungs. This is likely a viral infection. Discussed likely viral etiology with patient and recommended supportive cares. We will follow up on overnight Strep result tomorrow.      At the end of the encounter, I discussed all available results, as well as the diagnosis and any associated medications. I discussed red flags for immediate return to clinic/ER, as well as indications for follow up. Refer to patient instructions below, which were all addressed with patient. Patient understood and agreed to plan. Patient was appropriate for discharge.      Patient Instructions   Rapid Strep test was negative.   We will run the Strep culture and if this returns positive in 24-48 hours, we will notify you and call in antibiotic prescription.  You do NOT have signs of a tonsillitis or a tonsillar abscess on your exam today.    Symptoms are likely due to viral infection that will resolve on its own in 3-7 days.    May continue with symptomatic treatments  including:  -salt water gargles  -warm beverages such as tea  -throat drops  -ibuprofen or Tylenol for pain or fever  -Magic Mouthwash as needed    If fevers not coming down with Tylenol or ibuprofen, shortness of breath, not tolerating oral liquid intake, drooling, or stiff neck, return for evaluation immediately. Otherwise, if no improvement in the next week, follow up with primary care provider.              Lilly Daly PA-C

## 2018-09-12 NOTE — PATIENT INSTRUCTIONS
Rapid Strep test was negative.   We will run the Strep culture and if this returns positive in 24-48 hours, we will notify you and call in antibiotic prescription.  You do NOT have signs of a tonsillitis or a tonsillar abscess on your exam today.    Symptoms are likely due to viral infection that will resolve on its own in 3-7 days.    May continue with symptomatic treatments including:  -salt water gargles  -warm beverages such as tea  -throat drops  -ibuprofen or Tylenol for pain or fever  -Magic Mouthwash as needed    If fevers not coming down with Tylenol or ibuprofen, shortness of breath, not tolerating oral liquid intake, drooling, or stiff neck, return for evaluation immediately. Otherwise, if no improvement in the next week, follow up with primary care provider.

## 2018-09-20 NOTE — TELEPHONE ENCOUNTER
Third attempt.  Called and LVM for patient to call back in regards to scheduling.  Dania Hartman, FERNANDEZ  September 20, 2018, 1:52 PM

## 2018-10-08 ENCOUNTER — OFFICE VISIT (OUTPATIENT)
Dept: PEDIATRICS | Facility: CLINIC | Age: 23
End: 2018-10-08
Payer: COMMERCIAL

## 2018-10-08 VITALS
BODY MASS INDEX: 35.43 KG/M2 | RESPIRATION RATE: 14 BRPM | TEMPERATURE: 98.4 F | WEIGHT: 200 LBS | HEART RATE: 80 BPM | DIASTOLIC BLOOD PRESSURE: 60 MMHG | SYSTOLIC BLOOD PRESSURE: 108 MMHG

## 2018-10-08 DIAGNOSIS — Z23 NEED FOR HPV VACCINATION: ICD-10-CM

## 2018-10-08 DIAGNOSIS — R10.9 FLANK PAIN: ICD-10-CM

## 2018-10-08 DIAGNOSIS — F41.9 ANXIETY: Primary | ICD-10-CM

## 2018-10-08 DIAGNOSIS — Z23 NEED FOR PROPHYLACTIC VACCINATION AND INOCULATION AGAINST INFLUENZA: ICD-10-CM

## 2018-10-08 DIAGNOSIS — F33.1 MODERATE EPISODE OF RECURRENT MAJOR DEPRESSIVE DISORDER (H): ICD-10-CM

## 2018-10-08 LAB
ALBUMIN UR-MCNC: NEGATIVE MG/DL
APPEARANCE UR: CLEAR
BACTERIA #/AREA URNS HPF: ABNORMAL /HPF
BILIRUB UR QL STRIP: NEGATIVE
COLOR UR AUTO: YELLOW
GLUCOSE UR STRIP-MCNC: NEGATIVE MG/DL
HGB UR QL STRIP: NEGATIVE
KETONES UR STRIP-MCNC: NEGATIVE MG/DL
LEUKOCYTE ESTERASE UR QL STRIP: ABNORMAL
NITRATE UR QL: NEGATIVE
NON-SQ EPI CELLS #/AREA URNS LPF: ABNORMAL /LPF
PH UR STRIP: 6 PH (ref 5–7)
RBC #/AREA URNS AUTO: ABNORMAL /HPF
SOURCE: ABNORMAL
SP GR UR STRIP: 1.02 (ref 1–1.03)
UROBILINOGEN UR STRIP-ACNC: 0.2 EU/DL (ref 0.2–1)
WBC #/AREA URNS AUTO: ABNORMAL /HPF

## 2018-10-08 PROCEDURE — 90471 IMMUNIZATION ADMIN: CPT | Performed by: INTERNAL MEDICINE

## 2018-10-08 PROCEDURE — 90472 IMMUNIZATION ADMIN EACH ADD: CPT | Performed by: INTERNAL MEDICINE

## 2018-10-08 PROCEDURE — 99214 OFFICE O/P EST MOD 30 MIN: CPT | Mod: 25 | Performed by: INTERNAL MEDICINE

## 2018-10-08 PROCEDURE — 90686 IIV4 VACC NO PRSV 0.5 ML IM: CPT | Performed by: INTERNAL MEDICINE

## 2018-10-08 PROCEDURE — 81001 URINALYSIS AUTO W/SCOPE: CPT | Performed by: INTERNAL MEDICINE

## 2018-10-08 PROCEDURE — 90651 9VHPV VACCINE 2/3 DOSE IM: CPT | Performed by: INTERNAL MEDICINE

## 2018-10-08 PROCEDURE — 87086 URINE CULTURE/COLONY COUNT: CPT | Performed by: INTERNAL MEDICINE

## 2018-10-08 RX ORDER — CITALOPRAM HYDROBROMIDE 20 MG/1
20 TABLET ORAL DAILY
Qty: 60 TABLET | Refills: 0 | Status: SHIPPED | OUTPATIENT
Start: 2018-10-08 | End: 2018-11-07

## 2018-10-08 RX ORDER — ALPRAZOLAM 0.25 MG
0.25 TABLET ORAL PRN
Qty: 90 TABLET | Status: CANCELLED | OUTPATIENT
Start: 2018-10-08

## 2018-10-08 RX ORDER — HYDROXYZINE HYDROCHLORIDE 25 MG/1
25-50 TABLET, FILM COATED ORAL EVERY 6 HOURS PRN
Qty: 60 TABLET | Refills: 1 | Status: SHIPPED | OUTPATIENT
Start: 2018-10-08 | End: 2018-11-07

## 2018-10-08 ASSESSMENT — ANXIETY QUESTIONNAIRES
5. BEING SO RESTLESS THAT IT IS HARD TO SIT STILL: MORE THAN HALF THE DAYS
7. FEELING AFRAID AS IF SOMETHING AWFUL MIGHT HAPPEN: SEVERAL DAYS
2. NOT BEING ABLE TO STOP OR CONTROL WORRYING: NEARLY EVERY DAY
6. BECOMING EASILY ANNOYED OR IRRITABLE: SEVERAL DAYS
GAD7 TOTAL SCORE: 14
1. FEELING NERVOUS, ANXIOUS, OR ON EDGE: NEARLY EVERY DAY
3. WORRYING TOO MUCH ABOUT DIFFERENT THINGS: SEVERAL DAYS
IF YOU CHECKED OFF ANY PROBLEMS ON THIS QUESTIONNAIRE, HOW DIFFICULT HAVE THESE PROBLEMS MADE IT FOR YOU TO DO YOUR WORK, TAKE CARE OF THINGS AT HOME, OR GET ALONG WITH OTHER PEOPLE: EXTREMELY DIFFICULT

## 2018-10-08 ASSESSMENT — PATIENT HEALTH QUESTIONNAIRE - PHQ9: 5. POOR APPETITE OR OVEREATING: NEARLY EVERY DAY

## 2018-10-08 NOTE — PATIENT INSTRUCTIONS
Nice to see you today! I'm glad you came in.     Mental health  - restart celexa  - hydroxyzine as neeed  - therapy/counseling (CBT) - explore school options, [facility] should call you to schedule.    See me in 1 month.     I'll be in touch with urine results.     The [facility] Crisis Response Unit (CRU) provides 24-hour phone and face-to-face crisis intervention and consultation. Call [phone].   Other crisis resources:   Crisis Connection (24-hour hotline for mental health): 625.301.6715   [facility] Hotline for Youth Crisis and Suicide: 7-427-IYCZJJH (1-639.337.2109)

## 2018-10-08 NOTE — MR AVS SNAPSHOT
After Visit Summary   10/8/2018    Yolande Yadav    MRN: [id number]           Patient Information     Date Of Birth          1995        Visit Information        Provider Department      10/8/2018 1:30 PM Samson Juárez MD [facility] [place]        Today's Diagnoses     Anxiety    -  1    Moderate episode of recurrent major depressive disorder (H)        Flank pain        Need for HPV vaccination          Care Instructions    Nice to see you today! I'm glad you came in.     Mental health  - restart celexa  - hydroxyzine as neeed  - therapy/counseling (CBT) - explore school options, [facility] should call you to schedule.    See me in 1 month.     I'll be in touch with urine results.     The [facility] Crisis Response Unit (CRU) provides 24-hour phone and face-to-face crisis intervention and consultation. Call [phone].   Other crisis resources:   Crisis Connection (24-hour hotline for mental health): 372.245.3047   [facility] Hotline for Youth Crisis and Suicide: 3-607-AXVUPMB (1-884.598.1821)            Follow-ups after your visit        Additional Services     MENTAL HEALTH REFERRAL  - Adult; Outpatient Treatment; Individual/Couples/Family/Group Therapy/Health Psychology; Other: Behavioral Healthcare Providers [phone]; We will contact you to schedule the appointment or please call with any questi...       All scheduling is subject to the client's specific insurance plan & benefits, provider/location availability, and provider clinical specialities.  Please arrive 15 minutes early for your first appointment and bring your completed paperwork.    Please be aware that coverage of these services is subject to the terms and limitations of your health insurance plan.  Call member services at your health plan with any benefit or coverage questions.                            Follow-up notes from your care team     Return in about 4 weeks (around 11/5/2018) for Mental Health  Follow Up.      Your next 10 appointments already scheduled     Nov 08, 2018 11:10 AM CST   Office Visit with Samson Juárez MD   [facility]    [address]  [address]  [place] [address] [phone]           Bring a current list of meds and any records pertaining to this visit. For Physicals, please bring immunization records and any forms needing to be filled out. Please arrive 10 minutes early to complete paperwork.              Who to contact     If you have questions or need follow up information about today's clinic visit or your schedule please contact [facility] directly at [phone].  Normal or non-critical lab and imaging results will be communicated to you by [employer], letter or phone within 4 business days after the clinic has received the results. If you do not hear from us within 7 days, please contact the clinic through ibabyboxt or phone. If you have a critical or abnormal lab result, we will notify you by phone as soon as possible.  Submit refill requests through [employer] or call your pharmacy and they will forward the refill request to us. Please allow 3 business days for your refill to be completed.          Additional Information About Your Visit        [employer] Information     [employer] gives you secure access to your electronic health record. If you see a primary care provider, you can also send messages to your care team and make appointments. If you have questions, please call your primary care clinic.  If you do not have a primary care provider, please call [phone] and they will assist you.        Care EveryWhere ID     This is your Care EveryWhere ID. This could be used by other organizations to access your [place] medical records  [id number]        Your Vitals Were     Pulse Temperature Respirations Last Period BMI (Body Mass Index)       80 98.4  F (36.9  C) (Oral) 14 09/09/2018 35.43 kg/m2         Blood Pressure from Last 3 Encounters:   10/08/18 108/60   09/11/18 136/88   07/09/18 108/64    Weight from Last 3 Encounters:   10/08/18 200 lb (90.7 kg)   09/11/18 198 lb (89.8 kg)   07/09/18 190 lb 11.2 oz (86.5 kg)              We Performed the Following     *UA reflex to Microscopic and Culture ([facility] (except [place] and [place])     HPV, IM (9 - 26 YRS) - Gardasil 9     MENTAL HEALTH REFERRAL  - Adult; Outpatient Treatment; Individual/Couples/Family/Group Therapy/Health Psychology; Other: Behavioral Healthcare Providers [phone]; We will contact you to schedule the appointment or please call with any questi...          Today's Medication Changes          These changes are accurate as of 10/8/18  2:32 PM.  If you have any questions, ask your nurse or doctor.               Start taking these medicines.        Dose/Directions    hydrOXYzine 25 MG tablet   Commonly known as:  ATARAX   Used for:  Anxiety, Moderate episode of recurrent major depressive disorder (H)   Started by:  Samson Juárez MD        Dose:  25-50 mg   Take 1-2 tablets (25-50 mg) by mouth every 6 hours as needed for anxiety   Quantity:  60 tablet   Refills:  1         Stop taking these medicines if you haven't already. Please contact your care team if you have questions.     ALPRAZolam 0.25 MG tablet   Commonly known as:  XANAX   Stopped by:  Samson Juárez MD                Where to get your medicines      These medications were sent to "[id number] - [place], [place] - [address] AT [facility] [address]  [address], [place] [address]    Hours:  24-hours Phone:  [phone]     citalopram 20 MG tablet    hydrOXYzine 25 MG tablet                Primary Care Provider Office Phone # Fax #    Samson Juárez [phone] [address] [address]        Equal Access to Services     KATHLEEN ANN AH: Carlos meeks  susan Krishna, wapatricioda ludmadaha, qaallisonta kavitor mixon, darshana abdirahmanin hayaakatrina hernándezelizabeth yaala laAlonacoby kranthi. So [facility] [phone].    ATENCIÓN: Si habla esdras, tiene a young disposición servicios gratuitos de asistencia lingüística. Adenike al [phone].    We comply with applicable federal civil rights laws and [place] laws. We do not discriminate on the basis of race, color, national origin, age, disability, sex, sexual orientation, or gender identity.            Thank you!     Thank you for choosing [facility]  for your care. Our goal is always to provide you with excellent care. Hearing back from our patients is one way we can continue to improve our services. Please take a few minutes to complete the written survey that you may receive in the mail after your visit with us. Thank you!             Your Updated Medication List - Protect others around you: Learn how to safely use, store and throw away your medicines at www.disposemymeds.org.          This list is accurate as of 10/8/18  2:32 PM.  Always use your most recent med list.                   Brand Name Dispense Instructions for use Diagnosis    ACETAMINOPHEN PO           albuterol 108 (90 Base) MCG/ACT inhaler    PROAIR HFA/PROVENTIL HFA/VENTOLIN HFA     Inhale 2 puffs into the lungs every 6 hours        citalopram 20 MG tablet    celeXA    60 tablet    Take 1 tablet (20 mg) by mouth daily    Anxiety, Moderate episode of recurrent major depressive disorder (H)       fluticasone 50 MCG/ACT spray    FLONASE     Spray 1 spray into both nostrils daily        hydrOXYzine 25 MG tablet    ATARAX    60 tablet    Take 1-2 tablets (25-50 mg) by mouth every 6 hours as needed for anxiety    Anxiety, Moderate episode of recurrent major depressive disorder (H)       IBUPROFEN PO           UNKNOWN TO PATIENT      Allergy tablet daily.

## 2018-10-08 NOTE — PROGRESS NOTES
SUBJECTIVE:   Yolande Yadav is a 23 year old female who presents to clinic today for the following health issues:    Depression and Anxiety Follow-Up    Status since last visit: Worsened     Other associated symptoms: fidgety    Complicating factors: Lack of motivation    Significant life event: Yes-  School     Current substance abuse: None    PHQ 7/9/2018   PHQ-9 Total Score 2   Q9: Suicide Ideation Not at all     EMILY-7 SCORE 7/9/2018   Total Score 9     PHQ-9  English  PHQ-9   Any Language  EMILY-7  Suicide Assessment Five-step Evaluation and Treatment (SAFE-T)    Amount of exercise or physical activity: 2-3 days/week for an average of 15-30 minutes    Problems taking medications regularly: Pt stopped taking medication because she did not find them effective    Medication side effects: none    Diet: regular (no restrictions)    Was on sertraline and it didn't help.   Not currently on celexa.  Had prescription for 0.25 mg of ativan. Used when really anxious.     Overall feeling anxious and depressed.   As a source of stress.  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  Typically works very forehead but has been unable to because she is not feeling motivated.    Things with remains a very challenging.  She lives with a total of 4 women.  Her bedroom does not have good access to a bathroom.  She is to walk throughout of her roommates bedroom to get to the bathroom.  That roommate has a new boyfriend and so she is hesitant to walk through.  She has started using the bathroom at school and giving him to [facility] just use the bathroom.  There are additional stressors amongst the roommates.  She looked into trying to get a sub-letter so that she could change her living situation but the other remains to not approve this.  One roommate recently offered to change bedrooms that she can have better access to a bathroom.    She denies any SI or self-harm thoughts.    She has history of anxiety depression and thinks she was  previously on sertraline but did not think this worked.  More recently she was on Celexa and they are working on dose changes.  She did feel like this was helpful.  She also had a small supply of Ativan-understands that I typically do not do this long-term.  She did do counseling at some point through the school progressed since not have as much access.  She understands the importance of counseling.    Flank pain    Duration: Lastnight    Description (location/character/radiation): Lt side pain    Intensity:  mild    Accompanying signs and symptoms: Worse when bending     History (similar episodes/previous evaluation): None    Precipitating or alleviating factors: None    Therapies tried and outcome: IBU not effective     Not sure where this came from.  Does have a history of Pyelo and UTIs.  Notes her UTIs typically do not present with urinary symptoms. No fevers. No back pain.    Problem list and histories reviewed & adjusted, as indicated.  Additional history: as documented    Patient Active Problem List   Diagnosis     CARDIOVASCULAR SCREENING; LDL GOAL LESS THAN 160     History of pyelonephritis     Recurrent UTI     Anxiety     Class 1 obesity due to excess calories without serious comorbidity with body mass index (BMI) of 33.0 to 33.9 in adult     Mild persistent asthma without complication     History of depression     Past Surgical History:   Procedure Laterality Date     EXCIS VAGINAL CYST/TUMOR       HC TOOTH EXTRACTION W/FORCEP         Social History   Substance Use Topics     Smoking status: Never Smoker     Smokeless tobacco: Never Used     Alcohol use 0.0 oz/week     0 Standard drinks or equivalent per week      Comment: OCC     Family History   Problem Relation Age of Onset     Gallbladder Disease Mother      Obesity Mother      Hypertension Father      Obesity Father      Anxiety Disorder Brother      Diabetes Maternal Grandfather      Diabetes Paternal Grandmother          Current Outpatient  Prescriptions   Medication Sig Dispense Refill     ACETAMINOPHEN PO        albuterol (PROAIR HFA, PROVENTIL HFA, VENTOLIN HFA) 108 (90 BASE) MCG/ACT inhaler Inhale 2 puffs into the lungs every 6 hours       citalopram (CELEXA) 20 MG tablet Take 1 tablet (20 mg) by mouth daily 60 tablet 0     fluticasone (FLONASE) 50 MCG/ACT spray Spray 1 spray into both nostrils daily       hydrOXYzine (ATARAX) 25 MG tablet Take 1-2 tablets (25-50 mg) by mouth every 6 hours as needed for anxiety 60 tablet 1     IBUPROFEN PO        UNKNOWN TO PATIENT Allergy tablet daily.       [DISCONTINUED] citalopram (CELEXA) 20 MG tablet Take 20 mg by mouth daily       Allergies   Allergen Reactions     Seasonal Allergies        Reviewed and updated as needed this visit by clinical staff  Tobacco  Allergies  Meds  Med Hx  Surg Hx  Fam Hx  Soc Hx      Reviewed and updated as needed this visit by Provider         ROS:  Constitutional, HEENT, cardiovascular, pulmonary, gi and gu systems are negative, except as otherwise noted.    OBJECTIVE:     /60  Pulse 80  Temp 98.4  F (36.9  C) (Oral)  Resp 14  Wt 200 lb (90.7 kg)  LMP 09/09/2018  BMI 35.43 kg/m2  Body mass index is 35.43 kg/(m^2).  GENERAL: healthy, alert and no distress  RESP: lungs clear to auscultation - no rales, rhonchi or wheezes  CV: regular rate and rhythm, normal S1 S2, no S3 or S4, no murmur, click or rub, no peripheral edema and peripheral pulses strong  ABDOMEN: soft, nontender, no hepatosplenomegaly, no masses and bowel sounds normal  MS: no gross musculoskeletal defects noted, no edema  BACK: no CVA tenderness, no paralumbar tenderness  PSYCH: mentation appears normal, affect slightly flat, mildly anxious, tearful when talking about roommate situation, no si/hi    Diagnostic Test Results:  Pending see results notice    ASSESSMENT/PLAN:     1. Anxiety  2. Moderate episode of recurrent major depressive disorder (H)  Recurrent anxiety and depression. Discussed  bio/psycho/social model of mental health. Reviewed why long term bzds (even small doses, prn) are not a great treatment. Will restart celexa as this was helpful in the past - recheck in 1 month. Can use hydroxyzine as needed in the short/med term until ssri takes effect.   Also highly recommended counseling- she will look into school resources, P may be a better route & she is expecting a call.   No SI/HI.   See PI for plan and anticipatory guidance.   - citalopram (CELEXA) 20 MG tablet; Take 1 tablet (20 mg) by mouth daily  Dispense: 60 tablet; Refill: 0  - hydrOXYzine (ATARAX) 25 MG tablet; Take 1-2 tablets (25-50 mg) by mouth every 6 hours as needed for anxiety  Dispense: 60 tablet; Refill: 1  - *UA reflex to Microscopic and Culture ([facility] (except [place] and [place])  - MENTAL HEALTH REFERRAL  - Adult; Outpatient Treatment; Individual/Couples/Family/Group Therapy/Health Psychology; Other: Behavioral Healthcare Providers [phone]; We will contact you to schedule the appointment or please call with any questi...    3. Flank pain  May be GI related but given history of frequent UTIs will check urine.    4. Need for HPV vaccination  - HPV, IM (9 - 26 YRS) - Gardasil 9    Patient Instructions   Nice to see you today! I'm glad you came in.     Mental health  - restart celexa  - hydroxyzine as neeed  - therapy/counseling (CBT) - explore school options, [facility] should call you to schedule.    See me in 1 month.     I'll be in touch with urine results.     The [facility] Crisis Response Unit (CRU) provides 24-hour phone and face-to-face crisis intervention and consultation. Call [phone].   Other crisis resources:   Crisis Connection (24-hour hotline for mental health): 475.964.5205   [employer] [place] Hotline for Youth Crisis and Suicide: 0-467-DWUIZUN (0-490-766-6660)    Samson Juárez MD  [facility]

## 2018-10-08 NOTE — PROGRESS NOTES

## 2018-10-09 LAB
BACTERIA SPEC CULT: NORMAL
BACTERIA SPEC CULT: NORMAL
SPECIMEN SOURCE: NORMAL

## 2018-10-09 ASSESSMENT — ANXIETY QUESTIONNAIRES: GAD7 TOTAL SCORE: 14

## 2018-10-09 ASSESSMENT — ASTHMA QUESTIONNAIRES: ACT_TOTALSCORE: 24

## 2018-10-09 ASSESSMENT — PATIENT HEALTH QUESTIONNAIRE - PHQ9: SUM OF ALL RESPONSES TO PHQ QUESTIONS 1-9: 11

## 2018-10-15 PROBLEM — F33.1 MODERATE EPISODE OF RECURRENT MAJOR DEPRESSIVE DISORDER (H): Status: ACTIVE | Noted: 2018-10-08

## 2018-10-15 PROBLEM — F33.1 MODERATE EPISODE OF RECURRENT MAJOR DEPRESSIVE DISORDER (H): Status: ACTIVE | Noted: 2018-10-15

## 2018-10-18 ENCOUNTER — MYC MEDICAL ADVICE (OUTPATIENT)
Dept: PEDIATRICS | Facility: CLINIC | Age: 23
End: 2018-10-18

## 2018-11-07 ENCOUNTER — OFFICE VISIT (OUTPATIENT)
Dept: PEDIATRICS | Facility: CLINIC | Age: 23
End: 2018-11-07
Payer: COMMERCIAL

## 2018-11-07 VITALS
OXYGEN SATURATION: 97 % | HEIGHT: 63 IN | SYSTOLIC BLOOD PRESSURE: 104 MMHG | DIASTOLIC BLOOD PRESSURE: 64 MMHG | BODY MASS INDEX: 36.54 KG/M2 | TEMPERATURE: 98.2 F | WEIGHT: 206.2 LBS | HEART RATE: 72 BPM

## 2018-11-07 DIAGNOSIS — F39 MOOD DISORDER (H): Primary | ICD-10-CM

## 2018-11-07 DIAGNOSIS — Z11.8 SPECIAL SCREENING EXAMINATION FOR CHLAMYDIAL DISEASE: ICD-10-CM

## 2018-11-07 DIAGNOSIS — F33.1 MODERATE EPISODE OF RECURRENT MAJOR DEPRESSIVE DISORDER (H): ICD-10-CM

## 2018-11-07 DIAGNOSIS — F41.9 ANXIETY: ICD-10-CM

## 2018-11-07 DIAGNOSIS — L70.0 ACNE VULGARIS: ICD-10-CM

## 2018-11-07 PROCEDURE — 93000 ELECTROCARDIOGRAM COMPLETE: CPT | Performed by: INTERNAL MEDICINE

## 2018-11-07 PROCEDURE — 87491 CHLMYD TRACH DNA AMP PROBE: CPT | Performed by: INTERNAL MEDICINE

## 2018-11-07 PROCEDURE — 99214 OFFICE O/P EST MOD 30 MIN: CPT | Performed by: INTERNAL MEDICINE

## 2018-11-07 RX ORDER — CITALOPRAM HYDROBROMIDE 20 MG/1
20 TABLET ORAL DAILY
Qty: 90 TABLET | Refills: 0 | Status: SHIPPED | OUTPATIENT
Start: 2018-11-07 | End: 2019-03-07

## 2018-11-07 RX ORDER — HYDROXYZINE HYDROCHLORIDE 25 MG/1
25-50 TABLET, FILM COATED ORAL EVERY 6 HOURS PRN
Qty: 60 TABLET | Refills: 1 | Status: SHIPPED | OUTPATIENT
Start: 2018-11-07 | End: 2019-04-22

## 2018-11-07 ASSESSMENT — ANXIETY QUESTIONNAIRES
GAD7 TOTAL SCORE: 13
5. BEING SO RESTLESS THAT IT IS HARD TO SIT STILL: SEVERAL DAYS
3. WORRYING TOO MUCH ABOUT DIFFERENT THINGS: NEARLY EVERY DAY
6. BECOMING EASILY ANNOYED OR IRRITABLE: NEARLY EVERY DAY
7. FEELING AFRAID AS IF SOMETHING AWFUL MIGHT HAPPEN: NOT AT ALL
IF YOU CHECKED OFF ANY PROBLEMS ON THIS QUESTIONNAIRE, HOW DIFFICULT HAVE THESE PROBLEMS MADE IT FOR YOU TO DO YOUR WORK, TAKE CARE OF THINGS AT HOME, OR GET ALONG WITH OTHER PEOPLE: VERY DIFFICULT
2. NOT BEING ABLE TO STOP OR CONTROL WORRYING: SEVERAL DAYS
1. FEELING NERVOUS, ANXIOUS, OR ON EDGE: MORE THAN HALF THE DAYS

## 2018-11-07 ASSESSMENT — PATIENT HEALTH QUESTIONNAIRE - PHQ9
SUM OF ALL RESPONSES TO PHQ QUESTIONS 1-9: 8
5. POOR APPETITE OR OVEREATING: NEARLY EVERY DAY

## 2018-11-07 NOTE — PATIENT INSTRUCTIONS
Depression   - Call [facility] at [phone].   - goal working out (can start with walking) 3x a week for 30 min - this is part of treating depression  - think about joining an adult sports league  - keep celexa the same  - phone visit in 1 month - hopefully you've had 2 therapy sessions. If no change/worse -> we'll talk about increasing celexa. If getting better we'll talk about what to do with meds (prob same dose).    Skin  - wash face twice a day with mild cleanser with benzoyl peroxide in it  - start Differin (over the counter)  - use a good daily moisturizer    Let me know how it is in a month.     Differin Gel Instructions    Apply the Differin Gel- start a pea size over the whole face every other night for 2 weeks and then after two weeks nightly. This may be drying but your skin will build up tolerance to the medications. Daily moisturizer is still recommended with medication treatment.    -------------  The [facility] Crisis Response Unit (CRU) provides 24-hour phone and face-to-face crisis intervention and consultation. Call [phone].   Other crisis resources:   Crisis Connection (24-hour hotline for mental health): 960.510.5112   [place] Hotline for Youth Crisis and Suicide: 3-874-GHIEEBI (1-548.817.3018)

## 2018-11-07 NOTE — MR AVS SNAPSHOT
After Visit Summary   11/7/2018    Yolande Yadav    MRN: [id number]           Patient Information     Date Of Birth          1995        Visit Information        Provider Department      11/7/2018 1:30 PM Samson Juárez MD [facility]        Today's Diagnoses     Mood disorder (H)    -  1    Moderate episode of recurrent major depressive disorder (H)        Anxiety        Special screening examination for chlamydial disease        Acne vulgaris          Care Instructions    Depression   - Call [facility] at [phone].   - goal working out (can start with walking) 3x a week for 30 min - this is part of treating depression  - think about joining an adult sports league  - keep celexa the same  - phone visit in 1 month - hopefully you've had 2 therapy sessions. If no change/worse -> we'll talk about increasing celexa. If getting better we'll talk about what to do with meds (prob same dose).    Skin  - wash face twice a day with mild cleanser with benzoyl peroxide in it  - start Differin (over the counter)  - use a good daily moisturizer    Let me know how it is in a month.     Differin Gel Instructions    Apply the Differin Gel- start a pea size over the whole face every other night for 2 weeks and then after two weeks nightly. This may be drying but your skin will build up tolerance to the medications. Daily moisturizer is still recommended with medication treatment.    -------------  The [facility] Crisis Response Unit (CRU) provides 24-hour phone and face-to-face crisis intervention and consultation. Call [phone].   Other crisis resources:   Crisis Connection (24-hour hotline for mental health): 723.590.7945   [facility] Hotline for Youth Crisis and Suicide: 8-906-SZWNXWZ (1-655.819.8977)            Follow-ups after your visit        Follow-up notes from your care team     Return in about 4 weeks (around 12/5/2018) for Mental Health Follow Up - phone.      Who to  "contact     If you have questions or need follow up information about today's clinic visit or your schedule please contact [facility] directly at [phone].  Normal or non-critical lab and imaging results will be communicated to you by MyChart, letter or phone within 4 business days after the clinic has received the results. If you do not hear from us within 7 days, please contact the clinic through MyChart or phone. If you have a critical or abnormal lab result, we will notify you by phone as soon as possible.  Submit refill requests through [email or web address] or call your pharmacy and they will forward the refill request to us. Please allow 3 business days for your refill to be completed.          Additional Information About Your Visit        [email or web address] Information     [email or web address] gives you secure access to your electronic health record. If you see a primary care provider, you can also send messages to your care team and make appointments. If you have questions, please call your primary care clinic.  If you do not have a primary care provider, please call [phone] and they will assist you.        Care EveryWhere ID     This is your Care EveryWhere ID. This could be used by other organizations to access your [facility] medical records  [id number]        Your Vitals Were     Pulse Temperature Height Pulse Oximetry Breastfeeding? BMI (Body Mass Index)    72 98.2  F (36.8  C) (Oral) 5' 3\" (1.6 m) 97% No 36.53 kg/m2       Blood Pressure from Last 3 Encounters:   11/07/18 104/64   10/08/18 108/60   09/11/18 136/88    Weight from Last 3 Encounters:   11/07/18 206 lb 3.2 oz (93.5 kg)   10/08/18 200 lb (90.7 kg)   09/11/18 198 lb (89.8 kg)              We Performed the Following     Chlamydia trachomatis PCR     EKG 12-lead complete w/read - Clinics          Where to get your medicines      These medications were sent to [id number] - [place], [place] - [address] AT [facility] [address]  [address], [place] [address]    Hours:  24-hours Phone:  [phone]     citalopram 20 MG tablet    hydrOXYzine 25 MG tablet          Primary Care Provider Office Phone # Fax #    Samson Juárez [phone] [address]  [facility] [address]        Equal Access to Services     [facility] : Hadii aad ku hadasho Soomaali, waaxda luqadaha, qaybta kaalmada adeegyada, waxay idiin hayaan adeeg kharash la'aan ah. So [facility] [phone].    ATENCIÓN: Si habla español, tiene a young disposición servicios gratuitos de asistencia lingüística. Adenike al [phone].    We comply with applicable federal civil rights laws and [place] laws. We do not discriminate on the basis of race, color, national origin, age, disability, sex, sexual orientation, or gender identity.            Thank you!     Thank you for choosing [facility]  for your care. Our goal is always to provide you with excellent care. Hearing back from our patients is one way we can continue to improve our services. Please take a few minutes to complete the written survey that you may receive in the mail after your visit with us. Thank you!             Your Updated Medication List - Protect others around you: Learn how to safely use, store and throw away your medicines at www.disposemymeds.org.          This list is accurate as of 11/7/18  2:18 PM.  Always use your most recent med list.                   Brand Name Dispense Instructions for use Diagnosis    ACETAMINOPHEN PO           albuterol 108 (90 Base) MCG/ACT inhaler    PROAIR HFA/PROVENTIL HFA/VENTOLIN HFA     Inhale 2 puffs into the lungs every 6 hours        citalopram 20 MG tablet    celeXA    90 tablet    Take 1 tablet (20 mg) by mouth daily    Anxiety, Moderate episode of recurrent major depressive disorder (H)       fluticasone 50 MCG/ACT spray    FLONASE     Spray 1 spray into both nostrils daily        hydrOXYzine 25 MG tablet    ATARAX    60  tablet    Take 1-2 tablets (25-50 mg) by mouth every 6 hours as needed for anxiety    Anxiety, Moderate episode of recurrent major depressive disorder (H)       IBUPROFEN PO           UNKNOWN TO PATIENT      Allergy tablet daily.

## 2018-11-07 NOTE — PROGRESS NOTES
SUBJECTIVE:   Yolande Yadav is a 23 year old female who presents to clinic today for the following health issues:      Depression and Anxiety Follow-Up    Status since last visit: Improved     Other associated symptoms:None    Complicating factors:     Significant life event: No     Current substance abuse: None    PHQ 2018 10/8/2018   PHQ-9 Total Score 2 11   Q9: Suicide Ideation Not at all Not at all     EMILY-7 SCORE 2018 10/8/2018   Total Score 9 14     PHQ-9  English  PHQ-9   Any Language  EMILY-7  Suicide Assessment Five-step Evaluation and Treatment (SAFE-T)    Problems taking medications regularly: No    Medication side effects: none    Diet: regular (no restrictions)    Shares that other things have been hard this past year  - both her dogs  (17 years and 7 years)  - uncle underwent CABG out in [place]  - grandpa got really sick out in [place]    Feeling more tired, sleeping more during the day.   But has been able acknowledge this - which is better.   Thinks maybe medication is helping a bit but hard to tell.    Home life is better.   Had good convo with roommates about boyfriends, bathrooms.   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       Rash    Duration: about a month    Description  Location: chin   Itching: every once and while    Intensity:  severe    Accompanying signs and symptoms: None    History (similar episodes/previous evaluation): None    Precipitating or alleviating factors:  New exposures:  None  Recent travel: no      Therapies tried and outcome: hydrocortisone cream -  not effective    Problem list and histories reviewed & adjusted, as indicated.  Additional history: as documented    Patient Active Problem List   Diagnosis     CARDIOVASCULAR SCREENING; LDL GOAL LESS THAN 160     History of pyelonephritis     Recurrent UTI     Anxiety     Class 1 obesity due to excess calories without serious  "comorbidity with body mass index (BMI) of 33.0 to 33.9 in adult     Mild persistent asthma without complication     History of depression     Moderate episode of recurrent major depressive disorder (H)     Past Surgical History:   Procedure Laterality Date     EXCIS VAGINAL CYST/TUMOR       HC TOOTH EXTRACTION W/FORCEP         Social History   Substance Use Topics     Smoking status: Never Smoker     Smokeless tobacco: Never Used     Alcohol use 0.0 oz/week     0 Standard drinks or equivalent per week      Comment: OCC     Family History   Problem Relation Age of Onset     Gallbladder Disease Mother      Obesity Mother      Hypertension Father      Obesity Father      Anxiety Disorder Brother      Diabetes Maternal Grandfather      Diabetes Paternal Grandmother          Current Outpatient Prescriptions   Medication Sig Dispense Refill     ACETAMINOPHEN PO        albuterol (PROAIR HFA, PROVENTIL HFA, VENTOLIN HFA) 108 (90 BASE) MCG/ACT inhaler Inhale 2 puffs into the lungs every 6 hours       citalopram (CELEXA) 20 MG tablet Take 1 tablet (20 mg) by mouth daily 90 tablet 0     fluticasone (FLONASE) 50 MCG/ACT spray Spray 1 spray into both nostrils daily       hydrOXYzine (ATARAX) 25 MG tablet Take 1-2 tablets (25-50 mg) by mouth every 6 hours as needed for anxiety 60 tablet 1     IBUPROFEN PO        UNKNOWN TO PATIENT Allergy tablet daily.       [DISCONTINUED] citalopram (CELEXA) 20 MG tablet Take 1 tablet (20 mg) by mouth daily 60 tablet 0     Allergies   Allergen Reactions     Seasonal Allergies        Reviewed and updated as needed this visit by clinical staff  Tobacco  Allergies  Meds  Med Hx  Surg Hx  Fam Hx  Soc Hx      Reviewed and updated as needed this visit by Provider         ROS:  Constitutional, HEENT, cardiovascular, pulmonary, gi and gu systems are negative, except as otherwise noted.    OBJECTIVE:     /64  Pulse 72  Temp 98.2  F (36.8  C) (Oral)  Ht 5' 3\" (1.6 m)  Wt 206 lb 3.2 oz " (93.5 kg)  SpO2 97%  Breastfeeding? No  BMI 36.53 kg/m2  Body mass index is 36.53 kg/(m^2).  GENERAL: healthy, alert and no distress  RESP: lungs clear to auscultation - no rales, rhonchi or wheezes  CV: regular rate and rhythm, normal S1 S2, no S3 or S4, no murmur, click or rub, no peripheral edema and peripheral pulses strong  MS: no gross musculoskeletal defects noted, no edema  PSYCH: mentation appears normal, affect slightly flat but brighter than before, tearful when talking about dogs, no si  SKIN: few open/closed comedones on right lower face inferior to lip. No surrounding erythema or discharge.    Diagnostic Test Results:  Labs ordered    EKG with normal qtc.    ASSESSMENT/PLAN:       ICD-10-CM    1. Mood disorder (H) F39 EKG 12-lead complete w/read - Clinics   2. Moderate episode of recurrent major depressive disorder (H) F33.1 citalopram (CELEXA) 20 MG tablet     hydrOXYzine (ATARAX) 25 MG tablet   3. Anxiety F41.9 citalopram (CELEXA) 20 MG tablet     hydrOXYzine (ATARAX) 25 MG tablet   4. Special screening examination for chlamydial disease Z11.8 Chlamydia trachomatis PCR   5. Acne vulgaris L70.0      MH stable - not significantly improved. Has not started counseling yet. 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 Will call [facility]. Keep celexa at 20mg today, if not starting to improve in another 4 weeks will increase to 40 mg daily. EKG today in anticipation of this.  Also talked about role of diet/exercise, etc. See PI.    Try treating acne as below. Interesting that it is only on her chin. Does not appear to be infected. F/u in 1 month.    Patient Instructions   Depression   - Call [facility] at [phone].   - goal working out (can start with walking) 3x a week for 30 min - this is part of treating depression  - think about joining an adult sports league  - keep celexa the same  - phone visit in 1 month - hopefully you've had 2 therapy sessions. If no change/worse  -> we'll talk about increasing celexa. If getting better we'll talk about what to do with meds (prob same dose).    Skin  - wash face twice a day with mild cleanser with benzoyl peroxide in it  - start Differin (over the counter)  - use a good daily moisturizer    Let me know how it is in a month.     Differin Gel Instructions    Apply the Differin Gel- start a pea size over the whole face every other night for 2 weeks and then after two weeks nightly. This may be drying but your skin will build up tolerance to the medications. Daily moisturizer is still recommended with medication treatment.    -------------  The [facility] Crisis Response Unit (CRU) provides 24-hour phone and face-to-face crisis intervention and consultation. Call [phone].   Other crisis resources:   Crisis Connection (24-hour hotline for mental health): 506.324.4884   [facility] Hotline for Youth Crisis and Suicide: 9-077-MDDEFJU (1-841.653.5680)      Samson Juárez MD  [facility]

## 2018-11-07 NOTE — LETTER
My Depression Action Plan  Name: Yolande Yadav   Date of Birth 1995  Date: 11/7/2018    My doctor: Samson Juárez   My clinic: [address]  [address]  [facility] [address] [phone]          GREEN    ZONE   Good Control    What it looks like:     Things are going generally well. You have normal up s and down s. You may even feel depressed from time to time, but bad moods usually last less than a day.   What you need to do:  1. Continue to care for yourself (see self care plan)  2. Check your depression survival kit and update it as needed  3. Follow your physician s recommendations including any medication.  4. Do not stop taking medication unless you consult with your physician first.           YELLOW         ZONE Getting Worse    What it looks like:     Depression is starting to interfere with your life.     It may be hard to get out of bed; you may be starting to isolate yourself from others.    Symptoms of depression are starting to last most all day and this has happened for several days.     You may have suicidal thoughts but they are not constant.   What you need to do:     1. Call your care team, your response to treatment will improve if you keep your care team informed of your progress. Yellow periods are signs an adjustment may need to be made.     2. Continue your self-care, even if you have to fake it!    3. Talk to someone in your support network    4. Open up your depression survival kit           RED    ZONE Medical Alert - Get Help    What it looks like:     Depression is seriously interfering with your life.     You may experience these or other symptoms: You can t get out of bed most days, can t work or engage in other necessary activities, you have trouble taking care of basic hygiene, or basic responsibilities, thoughts of suicide or death that will not go away, self-injurious behavior.     What you need to do:  1. Call  your care team and request a same-day appointment. If they are not available (weekends or after hours) call your local crisis line, emergency room or 911.            Depression Self Care Plan / Survival Kit    Self-Care for Depression  Here s the deal. Your body and mind are really not as separate as most people think.  What you do and think affects how you feel and how you feel influences what you do and think. This means if you do things that people who feel good do, it will help you feel better.  Sometimes this is all it takes.  There is also a place for medication and therapy depending on how severe your depression is, so be sure to consult with your medical provider and/ or Behavioral Health Consultant if your symptoms are worsening or not improving.     In order to better manage my stress, I will:    Exercise  Get some form of exercise, every day. This will help reduce pain and release endorphins, the  feel good  chemicals in your brain. This is almost as good as taking antidepressants!  This is not the same as joining a gym and then never going! (they count on that by the way ) It can be as simple as just going for a walk or doing some gardening, anything that will get you moving.      Hygiene   Maintain good hygiene (Get out of bed in the morning, Make your bed, Brush your teeth, Take a shower, and Get dressed like you were going to work, even if you are unemployed).  If your clothes don't fit try to get ones that do.    Diet  I will strive to eat foods that are good for me, drink plenty of water, and avoid excessive sugar, caffeine, alcohol, and other mood-altering substances.  Some foods that are helpful in depression are: complex carbohydrates, B vitamins, flaxseed, fish or fish oil, fresh fruits and vegetables.    Psychotherapy  I agree to participate in Individual Therapy (if recommended).    Medication  If prescribed medications, I agree to take them.  Missing doses can result in serious side effects.   I understand that drinking alcohol, or other illicit drug use, may cause potential side effects.  I will not stop my medication abruptly without first discussing it with my provider.    Staying Connected With Others  I will stay in touch with my friends, family members, and my primary care provider/team.    Use your imagination  Be creative.  We all have a creative side; it doesn t matter if it s oil painting, sand castles, or mud pies! This will also kick up the endorphins.    Witness Beauty  (AKA stop and smell the roses) Take a look outside, even in mid-winter. Notice colors, textures. Watch the squirrels and birds.     Service to others  Be of service to others.  There is always someone else in need.  By helping others we can  get out of ourselves  and remember the really important things.  This also provides opportunities for practicing all the other parts of the program.    Humor  Laugh and be silly!  Adjust your TV habits for less news and crime-drama and more comedy.    Control your stress  Try breathing deep, massage therapy, biofeedback, and meditation. Find time to relax each day.     My support system    Clinic Contact:  Phone number:    Contact 1:  Phone number:    Contact 2:  Phone number:    Synagogue/:  Phone number:    Therapist:  Phone number:    [facility] crisis center:    Phone number:    Other community support:  Phone number:

## 2018-11-08 LAB
C TRACH DNA SPEC QL NAA+PROBE: NEGATIVE
SPECIMEN SOURCE: NORMAL

## 2018-11-08 ASSESSMENT — ANXIETY QUESTIONNAIRES: GAD7 TOTAL SCORE: 13

## 2018-12-11 ENCOUNTER — OFFICE VISIT (OUTPATIENT)
Dept: URGENT CARE | Facility: URGENT CARE | Age: 23
End: 2018-12-11
Payer: COMMERCIAL

## 2018-12-11 ENCOUNTER — ANCILLARY PROCEDURE (OUTPATIENT)
Dept: GENERAL RADIOLOGY | Facility: CLINIC | Age: 23
End: 2018-12-11
Attending: FAMILY MEDICINE
Payer: COMMERCIAL

## 2018-12-11 VITALS
TEMPERATURE: 97.6 F | WEIGHT: 201 LBS | SYSTOLIC BLOOD PRESSURE: 110 MMHG | DIASTOLIC BLOOD PRESSURE: 70 MMHG | BODY MASS INDEX: 35.61 KG/M2 | OXYGEN SATURATION: 97 % | HEART RATE: 74 BPM

## 2018-12-11 DIAGNOSIS — R51.9 PAIN OF CHEEK: ICD-10-CM

## 2018-12-11 DIAGNOSIS — S00.83XA CONTUSION OF CHEEK, INITIAL ENCOUNTER: Primary | ICD-10-CM

## 2018-12-11 PROCEDURE — 70150 X-RAY EXAM OF FACIAL BONES: CPT

## 2018-12-11 PROCEDURE — 99213 OFFICE O/P EST LOW 20 MIN: CPT | Performed by: FAMILY MEDICINE

## 2018-12-11 NOTE — PATIENT INSTRUCTIONS
Arnica gel:  Apply two or three times daily to help with swelling and bruising on L cheek.    Can continue ibuprofen if needed as well.    Anticipate continued improvement over the next 2 weeks.  Follow up with primary if not improving as anticipated.

## 2018-12-11 NOTE — PROGRESS NOTES
Yolande Yadav is a 23 year old female who presents to  for evaluation of L cheek pain after a fall on 11/30/18.  She tripped over a baby gate at her mom's house and struck her face on the edge of the couch.  She did not have any LOC in this incident.  She hasn't had any blurry vision or double vision, but she has noticed that her eyes tire more easily.  She has had some headaches as well, including one that has been present for the last two days.  No prior head injuries that she can remember.  She still has pain and some swelling below her L eye, and since it is still present 10+ days after injury, she wants to make sure there's nothing more serious going on here.    Past Medical History:   Diagnosis Date     Depression 2015     Pyelonephritis     recurrent as child     ROS:  No ear pain or change in hearing.  No pain in teeth  Bruising on left cheek has decreased, but is still present.    /70 (Cuff Size: Adult Regular)   Pulse 74   Temp 97.6  F (36.4  C) (Oral)   Wt 91.2 kg (201 lb)   SpO2 97%   BMI 35.61 kg/m    GEN: well-appearing, in NAD  HEENT: TMs and canals WNL, no rhinorrhea, normal oropharynx and dentition, oral MMM. Tender to palpation over the L zygomatic arch, especially in the mid portion.  There is mild tenderness about the L eye along with a small amount of swelling.  There is greenish bruising present on the L cheek along with a small amount of ecchymosis inferior to the L eye.  No ocular entrapment.  Normal fundi bilaterally.  EOMI, PERRL.  Neck:  Full AROM  Neuro:  No facial droop, tongue midline, normal strength in upper and lower extremities bilaterally    Xray of the facial bones was done.  I do not see any fractures.  Radiology review pending.    ASSESSMENT:  Contusion L cheek likely with some bone bruising in addition to soft tissue contusion    PLAN:  Patient Instructions   Arnica gel:  Apply two or three times daily to help with swelling and bruising on L cheek.    Can continue  ibuprofen if needed as well.    Anticipate continued improvement over the next 2 weeks.  Follow up with primary if not improving as anticipated.

## 2019-03-06 DIAGNOSIS — F41.9 ANXIETY: ICD-10-CM

## 2019-03-06 DIAGNOSIS — F33.1 MODERATE EPISODE OF RECURRENT MAJOR DEPRESSIVE DISORDER (H): ICD-10-CM

## 2019-03-06 NOTE — LETTER
March 7, 2019      Yolande Yadav  [address] [place] [address]          Dear Yolande,      In reviewing your recent refill request for citalopram, it was noted that you are overdue for a follow up phone visit with  Dr. Juárez regarding your depression.    Approval for a 30-day supply of the medication has been sent to your pharmacy.  Additional refills will be approved during your follow up visit.     Please contact our office at [phone] to schedule your phone appointment.     We will bill your insurance company for this service.  Please check with your medical insurance if this type of visit is covered. You may be responsible for the cost of this type of visit if insurance coverage is denied.  The typical cost is $30 (10min), $59 (11-20min) and $85 (21-30min).  Most often these visits are shorter than 10 minutes. If during the course of the call the physician/provider feels a telephone visit is not appropriate, you will not be charged for this service.        Sincerely,    [facility] [place]

## 2019-03-06 NOTE — TELEPHONE ENCOUNTER
"Requested Prescriptions   Pending Prescriptions Disp Refills     citalopram (CELEXA) 20 MG tablet  Last Written Prescription Date:  11/7/2018  Last Fill Quantity: 90,  # refills: 0   Last office visit: 11/7/2018 with prescribing provider: Samson Juárez     Future Office Visit:     90 tablet 0     Sig: Take 1 tablet (20 mg) by mouth daily    SSRIs Protocol Failed - 3/6/2019  1:48 PM       Failed - PHQ-9 score less than 5 in past 6 months    Please review last PHQ-9 score.     PHQ-9 SCORE 7/9/2018 10/8/2018 11/7/2018   PHQ-9 Total Score 2 11 8     EMILY-7 SCORE 7/9/2018 10/8/2018 11/7/2018   Total Score 9 14 13                Passed - Medication is active on med list       Passed - Patient is age 18 or older       Passed - No active pregnancy on record       Passed - No positive pregnancy test in last 12 months       Passed - Recent (6 mo) or future (30 days) visit within the authorizing provider's specialty    Patient had office visit in the last 6 months or has a visit in the next 30 days with authorizing provider or within the authorizing provider's specialty.  See \"Patient Info\" tab in inbasket, or \"Choose Columns\" in Meds & Orders section of the refill encounter.              "

## 2019-03-07 RX ORDER — CITALOPRAM HYDROBROMIDE 20 MG/1
20 TABLET ORAL DAILY
Qty: 30 TABLET | Refills: 0 | Status: SHIPPED | OUTPATIENT
Start: 2019-03-07 | End: 2019-04-10

## 2019-03-07 NOTE — TELEPHONE ENCOUNTER
"Sent in 30 day supply, due for f/u phone visit, sent letter reminder.     Per OV note from 11/7/18: \"Return in about 4 weeks (around 12/5/2018) for Mental Health Follow Up - phone. \"    "

## 2019-04-08 DIAGNOSIS — F33.1 MODERATE EPISODE OF RECURRENT MAJOR DEPRESSIVE DISORDER (H): ICD-10-CM

## 2019-04-08 DIAGNOSIS — F41.9 ANXIETY: ICD-10-CM

## 2019-04-08 NOTE — TELEPHONE ENCOUNTER
"Requested Prescriptions   Pending Prescriptions Disp Refills     citalopram (CELEXA) 20 MG tablet  Last Written Prescription Date:  3/7/19  Last Fill Quantity: 30,  # refills: 0    Last office visit: 11/7/18 with prescribing provider:  Samson Juárez MD       Future Office Visit:   Next 5 appointments (look out 90 days)    Apr 22, 2019 10:30 AM CDT  SHORT with Samson Juárez MD  [facility] [address] [place] [address]  [address]  [facility] [address]  [phone]          30 tablet 0     Sig: Take 1 tablet (20 mg) by mouth daily       SSRIs Protocol Failed - 4/8/2019 12:02 PM        Failed - PHQ-9 score less than 5 in past 6 months     Please review last PHQ-9 score.   PHQ-9 SCORE 7/9/2018 10/8/2018 11/7/2018   PHQ-9 Total Score 2 11 8     EMILY-7 SCORE 7/9/2018 10/8/2018 11/7/2018   Total Score 9 14 13               Passed - Medication is active on med list        Passed - Patient is age 18 or older        Passed - No active pregnancy on record        Passed - No positive pregnancy test in last 12 months        Passed - Recent (6 mo) or future (30 days) visit within the authorizing provider's specialty     Patient had office visit in the last 6 months or has a visit in the next 30 days with authorizing provider or within the authorizing provider's specialty.  See \"Patient Info\" tab in inbasket, or \"Choose Columns\" in Meds & Orders section of the refill encounter.              "

## 2019-04-08 NOTE — TELEPHONE ENCOUNTER
Pt came in to clinic today to see Dr. Juárez and to get refill on citalopram.  Dr Juárez out of clinic today; pt did not get message to r/s.  Pt set new appt to 4/22/19 but stated she will need med refill; about 1 week of meds remaining.  Refill in process, awaiting signature.   Writer L/M with pt that refill in process, rx  going to [facility] on [address]. tony

## 2019-04-10 RX ORDER — CITALOPRAM HYDROBROMIDE 20 MG/1
20 TABLET ORAL DAILY
Qty: 30 TABLET | Refills: 0 | Status: SHIPPED | OUTPATIENT
Start: 2019-04-10 | End: 2019-04-22

## 2019-04-10 NOTE — TELEPHONE ENCOUNTER
Routing refill request to provider for review/approval because:  Patient needs to be seen because:  At lov on 11/17/18, was to f/u in 4 weeks via some type of visit, has not been back, has an appt scheduled for 4/22/19, but has had a few other appts scheduled that have been cancelled

## 2019-04-22 ENCOUNTER — OFFICE VISIT (OUTPATIENT)
Dept: PEDIATRICS | Facility: CLINIC | Age: 24
End: 2019-04-22
Payer: COMMERCIAL

## 2019-04-22 VITALS
DIASTOLIC BLOOD PRESSURE: 58 MMHG | SYSTOLIC BLOOD PRESSURE: 108 MMHG | WEIGHT: 177.4 LBS | TEMPERATURE: 97.4 F | HEIGHT: 63 IN | HEART RATE: 58 BPM | BODY MASS INDEX: 31.43 KG/M2 | OXYGEN SATURATION: 99 %

## 2019-04-22 DIAGNOSIS — R06.83 SNORING: ICD-10-CM

## 2019-04-22 DIAGNOSIS — F41.9 ANXIETY: ICD-10-CM

## 2019-04-22 DIAGNOSIS — R53.83 FATIGUE, UNSPECIFIED TYPE: ICD-10-CM

## 2019-04-22 DIAGNOSIS — F39 MOOD DISORDER (H): Primary | ICD-10-CM

## 2019-04-22 DIAGNOSIS — F33.41 RECURRENT MAJOR DEPRESSIVE DISORDER, IN PARTIAL REMISSION (H): ICD-10-CM

## 2019-04-22 PROCEDURE — 99214 OFFICE O/P EST MOD 30 MIN: CPT | Performed by: INTERNAL MEDICINE

## 2019-04-22 RX ORDER — CITALOPRAM HYDROBROMIDE 20 MG/1
20 TABLET ORAL DAILY
Qty: 90 TABLET | Refills: 1 | Status: SHIPPED | OUTPATIENT
Start: 2019-04-22 | End: 2019-11-26

## 2019-04-22 RX ORDER — ALBUTEROL SULFATE 90 UG/1
2 AEROSOL, METERED RESPIRATORY (INHALATION) EVERY 6 HOURS
Qty: 8.5 G | Refills: 3 | Status: SHIPPED | OUTPATIENT
Start: 2019-04-22

## 2019-04-22 RX ORDER — HYDROXYZINE HYDROCHLORIDE 25 MG/1
25-50 TABLET, FILM COATED ORAL EVERY 6 HOURS PRN
Qty: 60 TABLET | Refills: 1 | Status: SHIPPED | OUTPATIENT
Start: 2019-04-22

## 2019-04-22 ASSESSMENT — ANXIETY QUESTIONNAIRES
GAD7 TOTAL SCORE: 13
3. WORRYING TOO MUCH ABOUT DIFFERENT THINGS: MORE THAN HALF THE DAYS
2. NOT BEING ABLE TO STOP OR CONTROL WORRYING: NEARLY EVERY DAY
6. BECOMING EASILY ANNOYED OR IRRITABLE: MORE THAN HALF THE DAYS
IF YOU CHECKED OFF ANY PROBLEMS ON THIS QUESTIONNAIRE, HOW DIFFICULT HAVE THESE PROBLEMS MADE IT FOR YOU TO DO YOUR WORK, TAKE CARE OF THINGS AT HOME, OR GET ALONG WITH OTHER PEOPLE: VERY DIFFICULT
1. FEELING NERVOUS, ANXIOUS, OR ON EDGE: NEARLY EVERY DAY
5. BEING SO RESTLESS THAT IT IS HARD TO SIT STILL: SEVERAL DAYS
7. FEELING AFRAID AS IF SOMETHING AWFUL MIGHT HAPPEN: NOT AT ALL

## 2019-04-22 ASSESSMENT — PATIENT HEALTH QUESTIONNAIRE - PHQ9
SUM OF ALL RESPONSES TO PHQ QUESTIONS 1-9: 7
5. POOR APPETITE OR OVEREATING: MORE THAN HALF THE DAYS

## 2019-04-22 ASSESSMENT — MIFFLIN-ST. JEOR: SCORE: 1528.81

## 2019-04-22 NOTE — PROGRESS NOTES
SUBJECTIVE:   Yolande Yadav is a 23 year old female who presents to clinic today for the following health issues:    Depression and Anxiety Follow-Up    Status since last visit: Improved    Other associated symptoms: higher anxiety     Complicating factors:     Significant life event: Yes but for the better     Current substance abuse: None    PHQ 7/9/2018 10/8/2018 11/7/2018   PHQ-9 Total Score 2 11 8   Q9: Thoughts of better off dead/self-harm past 2 weeks Not at all Not at all Not at all     EMILY-7 SCORE 7/9/2018 10/8/2018 11/7/2018   Total Score 9 14 13     PHQ-9  English  PHQ-9   Any Language  EMILY-7  Suicide Assessment Five-step Evaluation and Treatment (SAFE-T)    Amount of exercise or physical activity: 6-7 days/week for an average of 45-60 minutes    Problems taking medications regularly: No    Medication side effects: none    Diet: regular (no restrictions)    Last OV 11/7/18-   MH stable - not significantly improved. Has not started counseling yet. 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 Will call [facility]. Keep celexa at 20mg today, if not starting to improve in another 4 weeks will increase to 40 mg daily. EKG today in anticipation of this.  Also talked about role of diet/exercise, etc. See PI.     Try treating acne as below. Interesting that it is only on her chin. Does not appear to be infected. F/u in 1 month.    Went to a counselor about 10 times, stopped in January. Didn't have a connection.   Would stay busy to avoid living situation.   Will be out of her apt at the end of May.     School is going well. Ahead in a lot of her classes.   Interviewing for internships.   Will finish school in 1 year.     Additional history: as documented    Reviewed  and updated as needed this visit by clinical staff  Tobacco  Allergies  Meds         Reviewed and updated as needed this visit by Provider         Patient Active Problem List   Diagnosis     CARDIOVASCULAR  "SCREENING; LDL GOAL LESS THAN 160     History of pyelonephritis     Recurrent UTI     Anxiety     Class 1 obesity due to excess calories without serious comorbidity with body mass index (BMI) of 33.0 to 33.9 in adult     Mild persistent asthma without complication     History of depression     Moderate episode of recurrent major depressive disorder (H)     Past Surgical History:   Procedure Laterality Date     EXCIS VAGINAL CYST/TUMOR       HC TOOTH EXTRACTION W/FORCEP         Social History     Tobacco Use     Smoking status: Never Smoker     Smokeless tobacco: Never Used   Substance Use Topics     Alcohol use: Yes     Alcohol/week: 0.0 oz     Comment: OCC     Family History   Problem Relation Age of Onset     Gallbladder Disease Mother      Obesity Mother      Hypertension Father      Obesity Father      Anxiety Disorder Brother      Diabetes Maternal Grandfather      Diabetes Paternal Grandmother          Current Outpatient Medications   Medication Sig Dispense Refill     ACETAMINOPHEN PO        albuterol (PROAIR HFA/PROVENTIL HFA/VENTOLIN HFA) 108 (90 Base) MCG/ACT inhaler Inhale 2 puffs into the lungs every 6 hours 8.5 g 3     citalopram (CELEXA) 20 MG tablet Take 1 tablet (20 mg) by mouth daily 90 tablet 1     fluticasone (FLONASE) 50 MCG/ACT spray Spray 1 spray into both nostrils daily       hydrOXYzine (ATARAX) 25 MG tablet Take 1-2 tablets (25-50 mg) by mouth every 6 hours as needed for anxiety 60 tablet 1     IBUPROFEN PO        UNKNOWN TO PATIENT Allergy tablet daily.       Allergies   Allergen Reactions     Seasonal Allergies        ROS:  Constitutional, HEENT, cardiovascular, pulmonary, gi and gu systems are negative, except as otherwise noted.    OBJECTIVE:     /58   Pulse 58   Temp 97.4  F (36.3  C) (Tympanic)   Ht 1.6 m (5' 3\")   Wt 80.5 kg (177 lb 6.4 oz)   SpO2 99%   Breastfeeding? No   BMI 31.42 kg/m    Body mass index is 31.42 kg/m .  GENERAL: healthy, alert and no distress  CV: RRR. " Normal s1 and s2. No m/r/g.  PSYCH: mentation appears normal, affect normal/bright    Diagnostic Test Results:  none     ASSESSMENT/PLAN:     1. Mood disorder (H)  2. Anxiety  3. Recurrent major depressive disorder, in partial remission (H)  Will be getting own apt in a month or so- will hold on making changes for now. Consider addition of buspar if anxiety still high after move.  - citalopram (CELEXA) 20 MG tablet; Take 1 tablet (20 mg) by mouth daily  Dispense: 90 tablet; Refill: 1  - hydrOXYzine (ATARAX) 25 MG tablet; Take 1-2 tablets (25-50 mg) by mouth every 6 hours as needed for anxiety  Dispense: 60 tablet; Refill: 1    4. Snoring  5. Fatigue, unspecified type  Family history. Loud snoring. Does not wake up rested. Mood disorder. Obesity.   - SLEEP EVALUATION & MANAGEMENT REFERRAL - [facility]  [phone] (Age 18 and up); Future    Plan for Wellness towards the end of the summer.     Patient Instructions   It was nice to see you in clinic.    Keep the Celexa the same.   Refilled the hydroxyzine.   If in a few months the anxiety is still really high we should talk about a second daily medication.     See if the school counselor is still available or if there is someone else you can see there.    Let's see how the anxiety changes once you're in a new living place.   Phone visit in the middle/end of June.     Call and set up a sleep study.     Samson Juárez MD  [facility]

## 2019-04-22 NOTE — PATIENT INSTRUCTIONS
It was nice to see you in clinic.    Keep the Celexa the same.   Refilled the hydroxyzine.   If in a few months the anxiety is still really high we should talk about a second daily medication.     See if the school counselor is still available or if there is someone else you can see there.    Let's see how the anxiety changes once you're in a new living place.   Phone visit in the middle/end of June.     Call and set up a sleep study.

## 2019-04-23 ASSESSMENT — ANXIETY QUESTIONNAIRES: GAD7 TOTAL SCORE: 13

## 2019-11-26 ENCOUNTER — TELEPHONE (OUTPATIENT)
Dept: BEHAVIORAL HEALTH | Facility: CLINIC | Age: 24
End: 2019-11-26

## 2019-11-26 ENCOUNTER — OFFICE VISIT (OUTPATIENT)
Dept: PEDIATRICS | Facility: CLINIC | Age: 24
End: 2019-11-26
Payer: COMMERCIAL

## 2019-11-26 VITALS
HEIGHT: 63 IN | RESPIRATION RATE: 16 BRPM | BODY MASS INDEX: 35.6 KG/M2 | DIASTOLIC BLOOD PRESSURE: 70 MMHG | TEMPERATURE: 98.2 F | HEART RATE: 71 BPM | WEIGHT: 200.9 LBS | OXYGEN SATURATION: 96 % | SYSTOLIC BLOOD PRESSURE: 105 MMHG

## 2019-11-26 DIAGNOSIS — F33.1 MODERATE EPISODE OF RECURRENT MAJOR DEPRESSIVE DISORDER (H): ICD-10-CM

## 2019-11-26 DIAGNOSIS — F41.9 ANXIETY: ICD-10-CM

## 2019-11-26 DIAGNOSIS — Z11.3 SCREEN FOR STD (SEXUALLY TRANSMITTED DISEASE): ICD-10-CM

## 2019-11-26 DIAGNOSIS — F33.41 RECURRENT MAJOR DEPRESSIVE DISORDER, IN PARTIAL REMISSION (H): ICD-10-CM

## 2019-11-26 DIAGNOSIS — Z12.4 SCREENING FOR CERVICAL CANCER: ICD-10-CM

## 2019-11-26 DIAGNOSIS — Z00.00 ROUTINE GENERAL MEDICAL EXAMINATION AT A HEALTH CARE FACILITY: Primary | ICD-10-CM

## 2019-11-26 DIAGNOSIS — L01.00 IMPETIGO: ICD-10-CM

## 2019-11-26 PROCEDURE — G0145 SCR C/V CYTO,THINLAYER,RESCR: HCPCS | Performed by: NURSE PRACTITIONER

## 2019-11-26 PROCEDURE — 90651 9VHPV VACCINE 2/3 DOSE IM: CPT | Performed by: NURSE PRACTITIONER

## 2019-11-26 PROCEDURE — 99395 PREV VISIT EST AGE 18-39: CPT | Mod: 25 | Performed by: NURSE PRACTITIONER

## 2019-11-26 PROCEDURE — 87591 N.GONORRHOEAE DNA AMP PROB: CPT | Performed by: NURSE PRACTITIONER

## 2019-11-26 PROCEDURE — 90471 IMMUNIZATION ADMIN: CPT | Performed by: NURSE PRACTITIONER

## 2019-11-26 PROCEDURE — 99213 OFFICE O/P EST LOW 20 MIN: CPT | Mod: 25 | Performed by: NURSE PRACTITIONER

## 2019-11-26 PROCEDURE — 87491 CHLMYD TRACH DNA AMP PROBE: CPT | Performed by: NURSE PRACTITIONER

## 2019-11-26 RX ORDER — TRIAMCINOLONE ACETONIDE 1 MG/G
OINTMENT TOPICAL DAILY
Qty: 15 G | Refills: 0 | Status: SHIPPED | OUTPATIENT
Start: 2019-11-26 | End: 2019-12-03

## 2019-11-26 RX ORDER — CITALOPRAM HYDROBROMIDE 20 MG/1
20 TABLET ORAL DAILY
Qty: 90 TABLET | Refills: 1 | Status: SHIPPED | OUTPATIENT
Start: 2019-11-26 | End: 2020-03-27

## 2019-11-26 RX ORDER — BUSPIRONE HYDROCHLORIDE 10 MG/1
10 TABLET ORAL 2 TIMES DAILY
Qty: 180 TABLET | Refills: 0 | Status: SHIPPED | OUTPATIENT
Start: 2019-11-26 | End: 2020-02-05

## 2019-11-26 SDOH — ECONOMIC STABILITY: FOOD INSECURITY: WITHIN THE PAST 12 MONTHS, YOU WORRIED THAT YOUR FOOD WOULD RUN OUT BEFORE YOU GOT MONEY TO BUY MORE.: NEVER TRUE

## 2019-11-26 SDOH — ECONOMIC STABILITY: INCOME INSECURITY: HOW HARD IS IT FOR YOU TO PAY FOR THE VERY BASICS LIKE FOOD, HOUSING, MEDICAL CARE, AND HEATING?: NOT HARD AT ALL

## 2019-11-26 SDOH — SOCIAL STABILITY: SOCIAL NETWORK: IN A TYPICAL WEEK, HOW MANY TIMES DO YOU TALK ON THE PHONE WITH FAMILY, FRIENDS, OR NEIGHBORS?: THREE TIMES A WEEK

## 2019-11-26 SDOH — HEALTH STABILITY: MENTAL HEALTH: HOW OFTEN DO YOU HAVE 6 OR MORE DRINKS ON ONE OCCASION?: NEVER

## 2019-11-26 SDOH — HEALTH STABILITY: PHYSICAL HEALTH: ON AVERAGE, HOW MANY DAYS PER WEEK DO YOU ENGAGE IN MODERATE TO STRENUOUS EXERCISE (LIKE A BRISK WALK)?: 0 DAYS

## 2019-11-26 SDOH — HEALTH STABILITY: MENTAL HEALTH: HOW MANY STANDARD DRINKS CONTAINING ALCOHOL DO YOU HAVE ON A TYPICAL DAY?: 1 OR 2

## 2019-11-26 SDOH — HEALTH STABILITY: PHYSICAL HEALTH: ON AVERAGE, HOW MANY MINUTES DO YOU ENGAGE IN EXERCISE AT THIS LEVEL?: 0 MIN

## 2019-11-26 SDOH — SOCIAL STABILITY: SOCIAL NETWORK
DO YOU BELONG TO ANY CLUBS OR ORGANIZATIONS SUCH AS CHURCH GROUPS UNIONS, FRATERNAL OR ATHLETIC GROUPS, OR SCHOOL GROUPS?: NO

## 2019-11-26 SDOH — SOCIAL STABILITY: SOCIAL NETWORK: HOW OFTEN DO YOU ATTEND CHURCH OR RELIGIOUS SERVICES?: NEVER

## 2019-11-26 SDOH — SOCIAL STABILITY: SOCIAL NETWORK: HOW OFTEN DO YOU ATTENT MEETINGS OF THE CLUB OR ORGANIZATION YOU BELONG TO?: NEVER

## 2019-11-26 SDOH — HEALTH STABILITY: MENTAL HEALTH: HOW OFTEN DO YOU HAVE A DRINK CONTAINING ALCOHOL?: MONTHLY OR LESS

## 2019-11-26 SDOH — ECONOMIC STABILITY: TRANSPORTATION INSECURITY
IN THE PAST 12 MONTHS, HAS LACK OF TRANSPORTATION KEPT YOU FROM MEETINGS, WORK, OR FROM GETTING THINGS NEEDED FOR DAILY LIVING?: NO

## 2019-11-26 SDOH — ECONOMIC STABILITY: TRANSPORTATION INSECURITY
IN THE PAST 12 MONTHS, HAS THE LACK OF TRANSPORTATION KEPT YOU FROM MEDICAL APPOINTMENTS OR FROM GETTING MEDICATIONS?: NO

## 2019-11-26 SDOH — SOCIAL STABILITY: SOCIAL NETWORK: HOW OFTEN DO YOU GET TOGETHER WITH FRIENDS OR RELATIVES?: ONCE A WEEK

## 2019-11-26 SDOH — HEALTH STABILITY: MENTAL HEALTH
STRESS IS WHEN SOMEONE FEELS TENSE, NERVOUS, ANXIOUS, OR CAN'T SLEEP AT NIGHT BECAUSE THEIR MIND IS TROUBLED. HOW STRESSED ARE YOU?: ONLY A LITTLE

## 2019-11-26 SDOH — ECONOMIC STABILITY: FOOD INSECURITY: WITHIN THE PAST 12 MONTHS, THE FOOD YOU BOUGHT JUST DIDN'T LAST AND YOU DIDN'T HAVE MONEY TO GET MORE.: NEVER TRUE

## 2019-11-26 ASSESSMENT — ENCOUNTER SYMPTOMS
DIZZINESS: 0
MYALGIAS: 0
HEADACHES: 0
NERVOUS/ANXIOUS: 1
ARTHRALGIAS: 0
CHILLS: 0
FREQUENCY: 1
COUGH: 0
FEVER: 0
JOINT SWELLING: 0
CONSTIPATION: 0
DYSURIA: 1
ABDOMINAL PAIN: 0
NAUSEA: 0
HEMATURIA: 0
HEARTBURN: 0
EYE PAIN: 0
SORE THROAT: 0
DIARRHEA: 0
WEAKNESS: 0
BREAST MASS: 0
PALPITATIONS: 0
PARESTHESIAS: 0
SHORTNESS OF BREATH: 1
HEMATOCHEZIA: 0

## 2019-11-26 ASSESSMENT — PATIENT HEALTH QUESTIONNAIRE - PHQ9
SUM OF ALL RESPONSES TO PHQ QUESTIONS 1-9: 12
5. POOR APPETITE OR OVEREATING: NEARLY EVERY DAY
10. IF YOU CHECKED OFF ANY PROBLEMS, HOW DIFFICULT HAVE THESE PROBLEMS MADE IT FOR YOU TO DO YOUR WORK, TAKE CARE OF THINGS AT HOME, OR GET ALONG WITH OTHER PEOPLE: VERY DIFFICULT
SUM OF ALL RESPONSES TO PHQ QUESTIONS 1-9: 12

## 2019-11-26 ASSESSMENT — ASTHMA QUESTIONNAIRES
QUESTION_1 LAST FOUR WEEKS HOW MUCH OF THE TIME DID YOUR ASTHMA KEEP YOU FROM GETTING AS MUCH DONE AT WORK, SCHOOL OR AT HOME: NONE OF THE TIME
ACT_TOTALSCORE: 22
QUESTION_5 LAST FOUR WEEKS HOW WOULD YOU RATE YOUR ASTHMA CONTROL: COMPLETELY CONTROLLED
QUESTION_3 LAST FOUR WEEKS HOW OFTEN DID YOUR ASTHMA SYMPTOMS (WHEEZING, COUGHING, SHORTNESS OF BREATH, CHEST TIGHTNESS OR PAIN) WAKE YOU UP AT NIGHT OR EARLIER THAN USUAL IN THE MORNING: NOT AT ALL
QUESTION_2 LAST FOUR WEEKS HOW OFTEN HAVE YOU HAD SHORTNESS OF BREATH: ONCE A DAY
QUESTION_4 LAST FOUR WEEKS HOW OFTEN HAVE YOU USED YOUR RESCUE INHALER OR NEBULIZER MEDICATION (SUCH AS ALBUTEROL): NOT AT ALL

## 2019-11-26 ASSESSMENT — MIFFLIN-ST. JEOR: SCORE: 1630.41

## 2019-11-26 ASSESSMENT — ANXIETY QUESTIONNAIRES
3. WORRYING TOO MUCH ABOUT DIFFERENT THINGS: MORE THAN HALF THE DAYS
6. BECOMING EASILY ANNOYED OR IRRITABLE: SEVERAL DAYS
GAD7 TOTAL SCORE: 17
5. BEING SO RESTLESS THAT IT IS HARD TO SIT STILL: NEARLY EVERY DAY
2. NOT BEING ABLE TO STOP OR CONTROL WORRYING: NEARLY EVERY DAY
IF YOU CHECKED OFF ANY PROBLEMS ON THIS QUESTIONNAIRE, HOW DIFFICULT HAVE THESE PROBLEMS MADE IT FOR YOU TO DO YOUR WORK, TAKE CARE OF THINGS AT HOME, OR GET ALONG WITH OTHER PEOPLE: VERY DIFFICULT
7. FEELING AFRAID AS IF SOMETHING AWFUL MIGHT HAPPEN: MORE THAN HALF THE DAYS
1. FEELING NERVOUS, ANXIOUS, OR ON EDGE: NEARLY EVERY DAY

## 2019-11-26 NOTE — LETTER
My Asthma Action Plan    Name: Yolande Yadav   YOB: 1995  Date: 11/26/2019   My doctor: PRETTY Williamson CNP   My clinic: [facility]        My Rescue Medicine:   Albuterol inhaler (Proair/Ventolin/Proventil HFA)  2-4 puffs EVERY 4 HOURS as needed. Use a spacer if recommended by your provider.   My Asthma Severity:   Intermittent / Exercise Induced  Know your asthma triggers:             GREEN ZONE   Good Control    I feel good    No cough or wheeze    Can work, sleep and play without asthma symptoms       Take your asthma control medicine every day.     1. If exercise triggers your asthma, take your rescue medication    15 minutes before exercise or sports, and    During exercise if you have asthma symptoms  2. Spacer to use with inhaler: If you have a spacer, make sure to use it with your inhaler             YELLOW ZONE Getting Worse  I have ANY of these:    I do not feel good    Cough or wheeze    Chest feels tight    Wake up at night   1. Keep taking your Green Zone medications  2. Start taking your rescue medicine:    every 20 minutes for up to 1 hour. Then every 4 hours for 24-48 hours.  3. If you stay in the Yellow Zone for more than 12-24 hours, contact your doctor.  4. If you do not return to the Green Zone in 12-24 hours or you get worse, start taking your oral steroid medicine if prescribed by your provider.           RED ZONE Medical Alert - Get Help  I have ANY of these:    I feel awful    Medicine is not helping    Breathing getting harder    Trouble walking or talking    Nose opens wide to breathe       1. Take your rescue medicine NOW  2. If your provider has prescribed an oral steroid medicine, start taking it NOW  3. Call your doctor NOW  4. If you are still in the Red Zone after 20 minutes and you have not reached your doctor:    Take your rescue medicine again and    Call 911 or go to the emergency room right away    See your regular doctor within 2 weeks of  an Emergency Room or Urgent Care visit for follow-up treatment.          Annual Reminders:  Meet with Asthma Educator,  Flu Shot in the Fall, consider Pneumonia Vaccination for patients with asthma (aged 19 and older).    Pharmacy: [id number] - [place], [place] - [address] AT [facility] OF [address]                        Asthma Triggers  How To Control Things That Make Your Asthma Worse    Triggers are things that make your asthma worse.  Look at the list below to help you find your triggers and   what you can do about them. You can help prevent asthma flare-ups by staying away from your triggers.      Trigger                                                          What you can do   Cigarette Smoke  Tobacco smoke can make asthma worse. Do not allow smoking in your home, car or around you.  Be sure no one smokes at a child s day care or school.  If you smoke, ask your health care provider for ways to help you quit.  Ask family members to quit too.  Ask your health care provider for a referral to Quit Plan to help you quit smoking, or call 3-803-850-PLAN.     Colds, Flu, Bronchitis  These are common triggers of asthma. Wash your hands often.  Don t touch your eyes, nose or mouth.  Get a flu shot every year.     Dust Mites  These are tiny bugs that live in cloth or carpet. They are too small to see. Wash sheets and blankets in hot water every week.   Encase pillows and mattress in dust mite proof covers.  Avoid having carpet if you can. If you have carpet, vacuum weekly.   Use a dust mask and HEPA vacuum.   Pollen and Outdoor Mold  Some people are allergic to trees, grass, or weed pollen, or molds. Try to keep your windows closed.  Limit time out doors when pollen count is high.   Ask you health care provider about taking medicine during allergy season.     Animal Dander  Some people are allergic to skin flakes, urine or saliva from pets with fur or feathers. Keep pets with fur or feathers  out of your home.    If you can t keep the pet outdoors, then keep the pet out of your bedroom.  Keep the bedroom door closed.  Keep pets off cloth furniture and away from stuffed toys.     Mice, Rats, and Cockroaches  Some people are allergic to the waste from these pests.   Cover food and garbage.  Clean up spills and food crumbs.  Store grease in the refrigerator.   Keep food out of the bedroom.   Indoor Mold  This can be a trigger if your home has high moisture. Fix leaking faucets, pipes, or other sources of water.   Clean moldy surfaces.  Dehumidify basement if it is damp and smelly.   Smoke, Strong Odors, and Sprays  These can reduce air quality. Stay away from strong odors and sprays, such as perfume, powder, hair spray, paints, smoke incense, paint, cleaning products, candles and new carpet.   Exercise or Sports  Some people with asthma have this trigger. Be active!  Ask your doctor about taking medicine before sports or exercise to prevent symptoms.    Warm up for 5-10 minutes before and after sports or exercise.     Other Triggers of Asthma  Cold air:  Cover your nose and mouth with a scarf.  Sometimes laughing or crying can be a trigger.  Some medicines and food can trigger asthma.

## 2019-11-26 NOTE — PROGRESS NOTES
SUBJECTIVE:   CC: Yolande Yadav is an 24 year old woman who presents for preventive health visit.     Healthy Habits:     Getting at least 3 servings of Calcium per day:  NO    Bi-annual eye exam:  Yes    Dental care twice a year:  Yes    Sleep apnea or symptoms of sleep apnea:  None    Diet:  Regular (no restrictions)    Frequency of exercise:  None    Taking medications regularly:  Yes    Medication side effects:  None    PHQ-2 Total Score: 4    Additional concerns today:  Yes (Seen in [place] 3wks ago for rash, dx with impetigo, given cream, not helping, wondering about oral medication.)    History of depression and anxiety, at last office visit 7 month ago, thought of staying on celexa and perhaps adding buspar if didn't help. Since then, she notes she quit overnights, however gma dx'ed br ca and cervical ca. She is in grad school, one more semester for [employer]. She is not currently seeing therapist. She still struggles with anxiety type symptoms, has low mood and overeating. Denies thoughts of self harm or harming others and denies suicidal ideation.      EMILY-7 SCORE 11/7/2018 4/22/2019 11/26/2019   Total Score 13 13 17       PHQ-9 SCORE 11/7/2018 4/22/2019 11/26/2019   PHQ-9 Total Score MyChart - - 12 (Moderate depression)   PHQ-9 Total Score 8 7 12     Rash of chin, was seen elsewhere, used topical antibiotic, improved then came back.     Today's PHQ-2 Score:   PHQ-2 ( 1999 Pfizer) 11/26/2019   Q1: Little interest or pleasure in doing things 2   Q2: Feeling down, depressed or hopeless 2   PHQ-2 Score 4   Q1: Little interest or pleasure in doing things More than half the days   Q2: Feeling down, depressed or hopeless More than half the days   PHQ-2 Score 4       Abuse: Current or Past(Physical, Sexual or Emotional)- No  Do you feel safe in your environment? Yes    Social History     Tobacco Use     Smoking status: Never Smoker     Smokeless tobacco: Never Used   Substance Use Topics     Alcohol use: Yes  "    Alcohol/week: 0.0 standard drinks     Frequency: Monthly or less     Drinks per session: 1 or 2     Binge frequency: Never     Comment: OCC       Alcohol Use 11/26/2019   Prescreen: >3 drinks/day or >7 drinks/week? No     Reviewed orders with patient.  Reviewed health maintenance and updated orders accordingly - Yes  Lab work is in process    Mammogram not appropriate for this patient based on age.    Pertinent mammograms are reviewed under the imaging tab.  History of abnormal Pap smear: NO - age 30-65 PAP every 5 years with negative HPV co-testing recommended     Reviewed and updated as needed this visit by clinical staff  Tobacco  Allergies  Meds  Med Hx  Surg Hx  Fam Hx  Soc Hx        Reviewed and updated as needed this visit by Provider  Meds            Review of Systems   Constitutional: Negative for chills and fever.   HENT: Negative for congestion, hearing loss and sore throat.    Eyes: Negative for pain and visual disturbance.   Respiratory: Positive for shortness of breath. Negative for cough.    Cardiovascular: Negative for chest pain, palpitations and peripheral edema.   Gastrointestinal: Negative for abdominal pain, constipation, diarrhea, heartburn, hematochezia and nausea.   Breasts:  Negative for tenderness, breast mass and discharge.   Genitourinary: Positive for dysuria, frequency and urgency. Negative for genital sores, hematuria, pelvic pain, vaginal bleeding and vaginal discharge.   Musculoskeletal: Negative for arthralgias, joint swelling and myalgias.   Skin: Negative for rash.   Neurological: Negative for dizziness, weakness, headaches and paresthesias.   Psychiatric/Behavioral: Positive for mood changes. The patient is nervous/anxious.         OBJECTIVE:   /70   Pulse 71   Temp 98.2  F (36.8  C) (Tympanic)   Resp 16   Ht 1.6 m (5' 3\")   Wt 91.1 kg (200 lb 14.4 oz)   LMP 10/29/2019   SpO2 96%   BMI 35.59 kg/m    Physical Exam  GENERAL: healthy, alert and no " distress  EYES: Eyes grossly normal to inspection, PERRL and conjunctivae and sclerae normal  HENT: ear canals and TM's normal, nose and mouth without ulcers or lesions  NECK: no adenopathy, no asymmetry, masses, or scars and thyroid normal to palpation  RESP: lungs clear to auscultation - no rales, rhonchi or wheezes  CV: regular rate and rhythm, normal S1 S2, no S3 or S4, no murmur, click or rub, no peripheral edema and peripheral pulses strong  ABDOMEN: soft, nontender, no hepatosplenomegaly, no masses and bowel sounds normal   (female): normal female external genitalia, normal urethral meatus, vaginal mucosa, normal cervix/adnexa/uterus without masses or discharge  MS: no gross musculoskeletal defects noted, no edema  PSYCH: mentation appears normal, affect normal/bright  SKIN: Rash of chin shows redness      ASSESSMENT/PLAN:   1. Routine general medical examination at a health care facility      2. Moderate episode of recurrent major depressive disorder (H)  Not well controlled, notes more symptoms of anxiety vers depression. Denies thoughts of self harm or harming others and denies suicidal ideation.    3. Anxiety  Would like to establish with therapist. , referral placed. Will start buspar with celexa. follow up in 1 month.   - busPIRone (BUSPAR) 10 MG tablet; Take 1 tablet (10 mg) by mouth 2 times daily  Dispense: 180 tablet; Refill: 0  - citalopram (CELEXA) 20 MG tablet; Take 1 tablet (20 mg) by mouth daily  Dispense: 90 tablet; Refill: 1    4. Screening for cervical cancer    - PAP imaged thin layer screen only - recommended age 21 - 24 years  - HPV, IM (9 - 26 YRS) - Gardasil 9    5. Screen for STD (sexually transmitted disease)    - NEISSERIA GONORRHOEA PCR  - CHLAMYDIA TRACHOMATIS PCR    6. Recurrent major depressive disorder, in partial remission (H)    - citalopram (CELEXA) 20 MG tablet; Take 1 tablet (20 mg) by mouth daily  Dispense: 90 tablet; Refill: 1    7. Impetigo  Had been doing muprocin with  "limited results. WIll add steroid, unclear if bacterial infection is still present.   - triamcinolone (KENALOG) 0.1 % external ointment; Apply topically daily for 7 days  Dispense: 15 g; Refill: 0    COUNSELING:  Reviewed preventive health counseling, as reflected in patient instructions  Special attention given to:        Regular exercise       Healthy diet/nutrition       Contraception       Family planning       Folic Acid Counseling       Osteoporosis Prevention/Bone Health       Safe sex practices/STD prevention    Estimated body mass index is 35.59 kg/m  as calculated from the following:    Height as of this encounter: 1.6 m (5' 3\").    Weight as of this encounter: 91.1 kg (200 lb 14.4 oz).    Weight management plan: Discussed healthy diet and exercise guidelines     reports that she has never smoked. She has never used smokeless tobacco.      Counseling Resources:  ATP IV Guidelines  Pooled Cohorts Equation Calculator  Breast Cancer Risk Calculator  FRAX Risk Assessment  ICSI Preventive Guidelines  Dietary Guidelines for Americans, 2010  USDA's MyPlate  ASA Prophylaxis  Lung CA Screening    PRETTY Williamson [facility] [place]  "

## 2019-11-26 NOTE — PATIENT INSTRUCTIONS
Continue celexa and add buspar. Expect a call tos guillermina with Katlin quigley. follow up with me in 1 month.       Preventive Health Recommendations  Female Ages 21 to 25     Yearly exam:     See your health care provider every year in order to  o Review health changes.   o Discuss preventive care.    o Review your medicines if your doctor has prescribed any.      You should be tested each year for STDs (sexually transmitted diseases).       Talk to your provider about how often you should have cholesterol testing.      Get a Pap test every three years. If you have an abnormal result, your doctor may have you test more often.      If you are at risk for diabetes, you should have a diabetes test (fasting glucose).     Shots:     Get a flu shot each year.     Get a tetanus shot every 10 years.     Consider getting the shot (vaccine) that prevents cervical cancer (Gardasil).    Nutrition:     Eat at least 5 servings of fruits and vegetables each day.    Eat whole-grain bread, whole-wheat pasta and brown rice instead of white grains and rice.    Get adequate Calcium and Vitamin D.     Lifestyle    Exercise at least 150 minutes a week each week (30 minutes a day, 5 days a week). This will help you control your weight and prevent disease.    Limit alcohol to one drink per day.    No smoking.     Wear sunscreen to prevent skin cancer.    See your dentist every six months for an exam and cleaning.

## 2019-11-26 NOTE — TELEPHONE ENCOUNTER
Phone Encounter   [facility] attempted to reach patient, by PCP request. LM requesting a returned call and provided call back number.    Katlin Kapoor, RISSA, [facility]

## 2019-11-27 LAB
C TRACH DNA SPEC QL NAA+PROBE: NEGATIVE
N GONORRHOEA DNA SPEC QL NAA+PROBE: NEGATIVE
SPECIMEN SOURCE: NORMAL
SPECIMEN SOURCE: NORMAL

## 2019-11-27 ASSESSMENT — ANXIETY QUESTIONNAIRES: GAD7 TOTAL SCORE: 17

## 2019-11-27 ASSESSMENT — PATIENT HEALTH QUESTIONNAIRE - PHQ9: SUM OF ALL RESPONSES TO PHQ QUESTIONS 1-9: 12

## 2019-11-27 ASSESSMENT — ASTHMA QUESTIONNAIRES: ACT_TOTALSCORE: 22

## 2019-12-02 LAB
COPATH REPORT: NORMAL
PAP: NORMAL

## 2019-12-20 ENCOUNTER — OFFICE VISIT (OUTPATIENT)
Dept: URGENT CARE | Facility: URGENT CARE | Age: 24
End: 2019-12-20
Payer: COMMERCIAL

## 2019-12-20 VITALS
WEIGHT: 209 LBS | DIASTOLIC BLOOD PRESSURE: 60 MMHG | RESPIRATION RATE: 16 BRPM | BODY MASS INDEX: 37.02 KG/M2 | HEART RATE: 72 BPM | TEMPERATURE: 98.2 F | SYSTOLIC BLOOD PRESSURE: 120 MMHG | OXYGEN SATURATION: 100 %

## 2019-12-20 DIAGNOSIS — B34.9 VIRAL ILLNESS: ICD-10-CM

## 2019-12-20 DIAGNOSIS — J02.9 ACUTE PHARYNGITIS, UNSPECIFIED ETIOLOGY: Primary | ICD-10-CM

## 2019-12-20 LAB
DEPRECATED S PYO AG THROAT QL EIA: NORMAL
SPECIMEN SOURCE: NORMAL

## 2019-12-20 PROCEDURE — 99213 OFFICE O/P EST LOW 20 MIN: CPT | Performed by: STUDENT IN AN ORGANIZED HEALTH CARE EDUCATION/TRAINING PROGRAM

## 2019-12-20 PROCEDURE — 87880 STREP A ASSAY W/OPTIC: CPT | Performed by: PHYSICIAN ASSISTANT

## 2019-12-20 PROCEDURE — 87081 CULTURE SCREEN ONLY: CPT | Performed by: INTERNAL MEDICINE

## 2019-12-21 ASSESSMENT — ENCOUNTER SYMPTOMS
ABDOMINAL PAIN: 0
COUGH: 1
DYSURIA: 0
BACK PAIN: 0
PALPITATIONS: 0
ARTHRALGIAS: 0
EYE PAIN: 0
SHORTNESS OF BREATH: 0
TROUBLE SWALLOWING: 1
NAUSEA: 0
HEADACHES: 1
HEMATURIA: 0
DIARRHEA: 0
CONSTIPATION: 0
VOMITING: 0
CHILLS: 0
SORE THROAT: 0
FEVER: 0
COLOR CHANGE: 0

## 2019-12-21 NOTE — PROGRESS NOTES
SUBJECTIVE:   Yolande Yadav is a 24 year old female presenting with a chief complaint of   Chief Complaint   Patient presents with     Urgent Care     Pharyngitis     ST x 2 days. Post nasal drip. Sneezing. No fevers.        She is an established patient of [facility].    URI Adult    Onset of symptoms was 2 day(s) ago.  Course of illness is same.    Severity moderate  Current and Associated symptoms: stuffy nose, cough - productive, sore throat headache and fatigue  Treatment measures tried include Tylenol/Ibuprofen.  Predisposing factors include ill contact: Work.    Patient works at a high school. She jest wants to ensure it is not strep    Patient denies any nausea, vomiting, diarrhea, constipation and fevers. Patient is having trouble swallowing       Review of Systems   Constitutional: Negative for chills and fever.   HENT: Positive for congestion, sneezing and trouble swallowing. Negative for ear pain and sore throat.    Eyes: Negative for pain and visual disturbance.   Respiratory: Positive for cough. Negative for shortness of breath.    Cardiovascular: Negative for chest pain and palpitations.   Gastrointestinal: Negative for abdominal pain, constipation, diarrhea, nausea and vomiting.   Genitourinary: Negative for dysuria and hematuria.   Musculoskeletal: Negative for arthralgias and back pain.   Skin: Negative for color change and rash.   Neurological: Positive for headaches.   All other systems reviewed and are negative.      Past Medical History:   Diagnosis Date     Depression 2015     Depressive disorder 2016     Pyelonephritis     recurrent as child     Uncomplicated asthma 2010    sports induced     Family History   Problem Relation Age of Onset     Gallbladder Disease Mother      Obesity Mother      Asthma Mother         Had sarcoidosis, has difficulty breathing in certain air qualities     Hypertension Father      Obesity Father      Anxiety Disorder Brother      Diabetes Maternal Grandfather       Prostate Cancer Maternal Grandfather      Diabetes Paternal Grandmother      Breast Cancer Paternal Grandmother      Other Cancer Paternal Grandmother         Pre cancer in cervix     Prostate Cancer Paternal Grandfather      Current Outpatient Medications   Medication Sig Dispense Refill     albuterol (PROAIR HFA/PROVENTIL HFA/VENTOLIN HFA) 108 (90 Base) MCG/ACT inhaler Inhale 2 puffs into the lungs every 6 hours 8.5 g 3     busPIRone (BUSPAR) 10 MG tablet Take 1 tablet (10 mg) by mouth 2 times daily 180 tablet 0     citalopram (CELEXA) 20 MG tablet Take 1 tablet (20 mg) by mouth daily 90 tablet 1     fluticasone (FLONASE) 50 MCG/ACT spray Spray 1 spray into both nostrils daily       hydrOXYzine (ATARAX) 25 MG tablet Take 1-2 tablets (25-50 mg) by mouth every 6 hours as needed for anxiety 60 tablet 1     IBUPROFEN PO        UNKNOWN TO PATIENT Allergy tablet daily.       ACETAMINOPHEN PO        Social History     Tobacco Use     Smoking status: Never Smoker     Smokeless tobacco: Never Used   Substance Use Topics     Alcohol use: Yes     Alcohol/week: 0.0 standard drinks     Frequency: Monthly or less     Drinks per session: 1 or 2     Binge frequency: Never     Comment: OCC       OBJECTIVE  /60   Pulse 72   Temp 98.2  F (36.8  C) (Tympanic)   Resp 16   Wt 94.8 kg (209 lb)   SpO2 100%   BMI 37.02 kg/m      Physical Exam   Constitutional: Awake, alert, cooperative, no apparent distress, and appears stated age.  Eyes: Lids and lashes normal, sclera clear, conjunctiva normal.  ENT: Normocephalic, without obvious abnormality, atramatic, TMs clear with good land marks, tonsils 2+ with no erythema or exudate, no lymphadenopathy    Lungs: No increased work of breathing, good air exchange, clear to auscultation bilaterally, no crackles or wheezing.  Cardiovascular: Regular rate and rhythm, normal S1 and S2, no S3 or S4, and no murmur noted.  Musculoskeletal: No redness, warmth, or swelling of the joints.  Full  range of motion noted. Neurologic: Awake, alert, oriented to name, place and time.  Cranial nerves II-XII are grossly intact and gait is normal.      Labs:  Results for orders placed or performed in visit on 12/20/19 (from the past 24 hour(s))   Strep, Rapid Screen   Result Value Ref Range    Specimen Description Throat     Rapid Strep A Screen       NEGATIVE: No Group A streptococcal antigen detected by immunoassay, await culture report.       X-Ray was not done.    ASSESSMENT:      ICD-10-CM    1. Acute pharyngitis, unspecified etiology J02.9 Strep, Rapid Screen     Beta strep group A culture   2. Viral illness B34.9         Medical Decision Making:    Differential Diagnosis:  URI Adult/Peds:  Strep pharyngitis, Viral pharyngitis and Viral upper respiratory illness    Serious Comorbid Conditions:  Adult:  None    PLAN:    URI Adult:  Tylenol, Ibuprofen, Fluids, Rest, OTC cough suppressant/expectorant and OTC decongestant/antihistamine    Followup:    If not improving or if condition worsens, follow up with your Primary Care Provider    There are no Patient Instructions on file for this visit.    Niyah Mcbride DO  Family Medicine Resident

## 2019-12-22 LAB
BACTERIA SPEC CULT: NORMAL
SPECIMEN SOURCE: NORMAL

## 2019-12-31 NOTE — PROGRESS NOTES
Subjective     Yolande Yadav is a 24 year old female who presents to clinic today for the following health issues:    HPI   Depression and Anxiety Follow-Up    How are you doing with your depression since your last visit? No change    How are you doing with your anxiety since your last visit?  Improved     Are you having other symptoms that might be associated with depression or anxiety? Yes:  Extreme fatigue    Have you had a significant life event? No     Do you have any concerns with your use of alcohol or other drugs? No    Social History     Tobacco Use     Smoking status: Never Smoker     Smokeless tobacco: Never Used   Substance Use Topics     Alcohol use: Yes     Alcohol/week: 0.0 standard drinks     Frequency: Monthly or less     Drinks per session: 1 or 2     Binge frequency: Never     Comment: OCC     Drug use: No     PHQ 4/22/2019 11/26/2019 1/6/2020   PHQ-9 Total Score 7 12 10   Q9: Thoughts of better off dead/self-harm past 2 weeks Not at all Not at all Not at all     EMILY-7 SCORE 4/22/2019 11/26/2019 1/6/2020   Total Score 13 17 12       In the past two weeks have you had thoughts of suicide or self-harm?  No.    Do you have concerns about your personal safety or the safety of others?   No    Suicide Assessment Five-step Evaluation and Treatment (SAFE-T)      How many servings of fruits and vegetables do you eat daily?  2-3    On average, how many sweetened beverages do you drink each day (Examples: soda, juice, sweet tea, etc.  Do NOT count diet or artificially sweetened beverages)?   1-2    How many days per week do you miss taking your medication? 0    At last visit, we had her start buspar and celexa and start with therapist. Since then, she reports she did start meds and she did decreased dose of buspar from twice a day to once a day because of side effects of feeling tired. No other noted side effects. Hasn't yet established with therapist, playing phone tag.     ALSO, history of frequent UTIs  and as a results he can wet the bed, about 50% of the nights she wets the bed, empties bladder and doesn't wake up. She had frequent UTIs as tweenager and she consistently wet the bed nightly at that time and had infections about 3 times per year. She never had imaging for futher eval. Her last UTI was 3 yrs ago. She never urinates during the day. She drinks 1 gallon of water daily, urinates often throughout the day, no enuresis.     Patient Active Problem List   Diagnosis     CARDIOVASCULAR SCREENING; LDL GOAL LESS THAN 160     History of pyelonephritis     Recurrent UTI     Anxiety     Class 1 obesity due to excess calories without serious comorbidity with body mass index (BMI) of 33.0 to 33.9 in adult     Mild persistent asthma without complication     History of depression     Moderate episode of recurrent major depressive disorder (H)     Nocturnal enuresis     Past Surgical History:   Procedure Laterality Date     EXCIS VAGINAL CYST/TUMOR       HC TOOTH EXTRACTION W/FORCEP         Social History     Tobacco Use     Smoking status: Never Smoker     Smokeless tobacco: Never Used   Substance Use Topics     Alcohol use: Yes     Alcohol/week: 0.0 standard drinks     Frequency: Monthly or less     Drinks per session: 1 or 2     Binge frequency: Never     Comment: OCC     Family History   Problem Relation Age of Onset     Gallbladder Disease Mother      Obesity Mother      Asthma Mother         Had sarcoidosis, has difficulty breathing in certain air qualities     Hypertension Father      Obesity Father      Anxiety Disorder Brother      Diabetes Maternal Grandfather      Prostate Cancer Maternal Grandfather      Diabetes Paternal Grandmother      Breast Cancer Paternal Grandmother      Other Cancer Paternal Grandmother         Pre cancer in cervix     Prostate Cancer Paternal Grandfather            Reviewed and updated as needed this visit by Provider         Review of Systems   ROS COMP: Constitutional, HEENT,  "cardiovascular, pulmonary, gi and gu systems are negative, except as otherwise noted.      Objective    /67   Pulse 62   Temp 97.6  F (36.4  C) (Oral)   Resp 16   Ht 1.6 m (5' 3\")   Wt 94 kg (207 lb 3.2 oz)   SpO2 98%   BMI 36.70 kg/m    Body mass index is 36.7 kg/m .  Physical Exam   GENERAL: healthy, alert and no distress  PSYCH: mentation appears normal, affect normal/bright          Assessment & Plan     1. Moderate episode of recurrent major depressive disorder (H)  Has improved somewhat with the addition of celexa dn buspar. She does have side effects of drowsiness with buspar and will discharge this medication. We discussed she is looking forward to schedulign with therapist when she can get scheduled. We could also consider increasing celexa dose if needed. Will reach out in 1 mo    2. Anxiety  Per above    3. Nocturnal enuresis  This is a longstanding issue which she has never been evaluated for. She has a history of recurrent UTIs, but has only had them at most 3 times per year and last one was 3 yrs ago. She continues to have nocturnal enuresis without any day time symptoms or other urinary symptoms of concern. Will start with labs and refer to urology. We briefly discussed potentialr eferral to pelvic floor pt as well.   - Renal panel (Alb, BUN, Ca, Cl, CO2, Creat, Gluc, Phos, K, Na)  - UROLOGY ADULT REFERRAL         Return in about 4 weeks (around 2/3/2020) for Depression and/or anxiety.    PRETTY [facility] OLENA    "

## 2019-12-31 NOTE — PROGRESS NOTES
Pre-Visit Planning     Future Appointments   Date Time Provider Department Center   1/6/2020 11:00 AM Doris Tena, APRN CNP [facility]     Arrival Time for this Appointment: 10:40 AM   Appointment Notes for this encounter:   Depression and Anxiety F/u    Questionnaires Reviewed/Assigned  Additional questionnaires assigned - PHQ-9 and EMILY-7    Patient preferred phone number: [phone]    Unable to reach. Left voicemail. Advised patient to call clinic back at [phone].    Previous appointment instructions    1. Moderate episode of recurrent major depressive disorder (H)  Not well controlled, notes more symptoms of anxiety vers depression. Denies thoughts of self harm or harming others and denies suicidal ideation.     2. Anxiety  Would like to establish with therapist, referral placed. Will start buspar with celexa. follow up in 1 month.   - busPIRone (BUSPAR) 10 MG tablet  - citalopram (CELEXA) 20 MG tablet      3. Screen for STD (sexually transmitted disease)  - NEISSERIA GONORRHOEA PCR  - CHLAMYDIA TRACHOMATIS PCR    Negative G/C    Kaelyn Bolanos, EMT at 9:48 AM on January 2, 2020  Clinic Health Guide   [phone]

## 2020-01-02 ENCOUNTER — PRE VISIT (OUTPATIENT)
Dept: PEDIATRICS | Facility: CLINIC | Age: 25
End: 2020-01-02

## 2020-01-02 NOTE — TELEPHONE ENCOUNTER
Called pt to [facility] for upcoming appointment, pt did not answer. LVM for pt to call us back with any questions/concerns or if they need to cancel/reschedule their appointment.     Kaelyn Bolanos, EMT at 10:55 AM on January 2, 2020  [facility] Health Guide   [phone]

## 2020-01-06 ENCOUNTER — OFFICE VISIT (OUTPATIENT)
Dept: PEDIATRICS | Facility: CLINIC | Age: 25
End: 2020-01-06
Payer: COMMERCIAL

## 2020-01-06 VITALS
HEIGHT: 63 IN | TEMPERATURE: 97.6 F | WEIGHT: 207.2 LBS | DIASTOLIC BLOOD PRESSURE: 67 MMHG | SYSTOLIC BLOOD PRESSURE: 103 MMHG | OXYGEN SATURATION: 98 % | BODY MASS INDEX: 36.71 KG/M2 | HEART RATE: 62 BPM | RESPIRATION RATE: 16 BRPM

## 2020-01-06 DIAGNOSIS — F33.1 MODERATE EPISODE OF RECURRENT MAJOR DEPRESSIVE DISORDER (H): Primary | ICD-10-CM

## 2020-01-06 DIAGNOSIS — N39.44 NOCTURNAL ENURESIS: ICD-10-CM

## 2020-01-06 DIAGNOSIS — F41.9 ANXIETY: ICD-10-CM

## 2020-01-06 PROBLEM — F39 MOOD DISORDER (H): Status: RESOLVED | Noted: 2019-04-22 | Resolved: 2020-01-06

## 2020-01-06 PROCEDURE — 36415 COLL VENOUS BLD VENIPUNCTURE: CPT | Performed by: NURSE PRACTITIONER

## 2020-01-06 PROCEDURE — 99214 OFFICE O/P EST MOD 30 MIN: CPT | Performed by: NURSE PRACTITIONER

## 2020-01-06 PROCEDURE — 80069 RENAL FUNCTION PANEL: CPT | Performed by: NURSE PRACTITIONER

## 2020-01-06 ASSESSMENT — ANXIETY QUESTIONNAIRES
5. BEING SO RESTLESS THAT IT IS HARD TO SIT STILL: MORE THAN HALF THE DAYS
IF YOU CHECKED OFF ANY PROBLEMS ON THIS QUESTIONNAIRE, HOW DIFFICULT HAVE THESE PROBLEMS MADE IT FOR YOU TO DO YOUR WORK, TAKE CARE OF THINGS AT HOME, OR GET ALONG WITH OTHER PEOPLE: SOMEWHAT DIFFICULT
3. WORRYING TOO MUCH ABOUT DIFFERENT THINGS: MORE THAN HALF THE DAYS
7. FEELING AFRAID AS IF SOMETHING AWFUL MIGHT HAPPEN: SEVERAL DAYS
GAD7 TOTAL SCORE: 12
6. BECOMING EASILY ANNOYED OR IRRITABLE: SEVERAL DAYS
2. NOT BEING ABLE TO STOP OR CONTROL WORRYING: MORE THAN HALF THE DAYS
1. FEELING NERVOUS, ANXIOUS, OR ON EDGE: SEVERAL DAYS

## 2020-01-06 ASSESSMENT — PATIENT HEALTH QUESTIONNAIRE - PHQ9
SUM OF ALL RESPONSES TO PHQ QUESTIONS 1-9: 10
5. POOR APPETITE OR OVEREATING: NEARLY EVERY DAY

## 2020-01-06 ASSESSMENT — MIFFLIN-ST. JEOR: SCORE: 1658.98

## 2020-01-06 NOTE — PATIENT INSTRUCTIONS
Drop the buspar and see if the tiredness goes away. If your symptoms increase, let me know. Keep the celexa dose the same for now, and establish with therapist as soon as feasible.     Send me an update by [employer] in 1 month to see how things are going.

## 2020-01-07 LAB
ALBUMIN SERPL-MCNC: 4 G/DL (ref 3.4–5)
ANION GAP SERPL CALCULATED.3IONS-SCNC: 5 MMOL/L (ref 3–14)
BUN SERPL-MCNC: 16 MG/DL (ref 7–30)
CALCIUM SERPL-MCNC: 8.7 MG/DL (ref 8.5–10.1)
CHLORIDE SERPL-SCNC: 107 MMOL/L (ref 94–109)
CO2 SERPL-SCNC: 26 MMOL/L (ref 20–32)
CREAT SERPL-MCNC: 0.74 MG/DL (ref 0.52–1.04)
GFR SERPL CREATININE-BSD FRML MDRD: >90 ML/MIN/{1.73_M2}
GLUCOSE SERPL-MCNC: 68 MG/DL (ref 70–99)
PHOSPHATE SERPL-MCNC: 2.9 MG/DL (ref 2.5–4.5)
POTASSIUM SERPL-SCNC: 4.2 MMOL/L (ref 3.4–5.3)
SODIUM SERPL-SCNC: 138 MMOL/L (ref 133–144)

## 2020-01-07 ASSESSMENT — ANXIETY QUESTIONNAIRES: GAD7 TOTAL SCORE: 12

## 2020-01-07 NOTE — TELEPHONE ENCOUNTER
MEDICAL RECORDS REQUEST   [facility]  Urology Clinic  [address] [place] [address]  PHONE: [phone]  Fax: [phone]        FUTURE VISIT INFORMATION                                                   Yolande Leif : 1995 scheduled for future visit at [facility]    APPOINTMENT INFORMATION:    Date: 20 8:30AM     Provider:  Angela Harlye MD    Reason for Visit/Diagnosis: Nocturnal enuresis     REFERRAL INFORMATION:    Referring provider:  SNEHAL RAMIREZ    Specialty: APRN CNP     Referring providers clinic:  [facility]     Clinic contact number:  [phone]    RECORDS REQUESTED FOR VISIT                                                     NOTES  STATUS/DETAILS   OFFICE NOTE from referring provider  yes   OFFICE NOTE from other specialist  no   DISCHARGE SUMMARY from hospital  yes   DISCHARGE REPORT from the ER  yes   OPERATIVE REPORT  no   MEDICATION LIST  yes   LABS     URINALYSIS (UA)  yes     PRE-VISIT CHECKLIST      Record collection complete Yes- [facility] recs in CE   Images in  PACS   CT ABD Pelvis 17 - [facility]   CT ABD Pelvis 3/12/17 - [facility]    Appointment appropriately scheduled           (right time/right provider) Yes   MyChart activation Yes   Questionnaire complete If no, please explain: In process      Completed by: Janki Busby

## 2020-01-16 ENCOUNTER — PRE VISIT (OUTPATIENT)
Dept: UROLOGY | Facility: CLINIC | Age: 25
End: 2020-01-16

## 2020-01-16 NOTE — TELEPHONE ENCOUNTER
Reason for Visit: Consult    Diagnosis: nocturnal enuresis    Orders/Procedures/Records: no outside records    Contact Patient: n/a    Rooming Requirements: UA dip / PVR      Whitney Rodriguez LPN  01/16/20  12:23 PM

## 2020-01-19 ASSESSMENT — ENCOUNTER SYMPTOMS
DYSURIA: 0
DECREASED CONCENTRATION: 1
INSOMNIA: 0
FLANK PAIN: 0
PANIC: 0
NERVOUS/ANXIOUS: 1
HEMATURIA: 0
DEPRESSION: 1
DIFFICULTY URINATING: 0

## 2020-01-22 ENCOUNTER — OFFICE VISIT (OUTPATIENT)
Dept: UROLOGY | Facility: CLINIC | Age: 25
End: 2020-01-22
Attending: NURSE PRACTITIONER
Payer: COMMERCIAL

## 2020-01-22 ENCOUNTER — PRE VISIT (OUTPATIENT)
Dept: UROLOGY | Facility: CLINIC | Age: 25
End: 2020-01-22

## 2020-01-22 VITALS
HEIGHT: 63 IN | BODY MASS INDEX: 36.68 KG/M2 | WEIGHT: 207 LBS | HEART RATE: 84 BPM | DIASTOLIC BLOOD PRESSURE: 78 MMHG | SYSTOLIC BLOOD PRESSURE: 117 MMHG

## 2020-01-22 DIAGNOSIS — N94.10 DYSPAREUNIA, FEMALE: ICD-10-CM

## 2020-01-22 DIAGNOSIS — R35.0 FREQUENCY OF URINATION: ICD-10-CM

## 2020-01-22 DIAGNOSIS — N39.44 NOCTURNAL ENURESIS: Primary | ICD-10-CM

## 2020-01-22 DIAGNOSIS — R39.15 URINARY URGENCY: ICD-10-CM

## 2020-01-22 LAB
ALBUMIN UR-MCNC: NEGATIVE MG/DL
APPEARANCE UR: CLEAR
BILIRUB UR QL STRIP: NEGATIVE
COLOR UR AUTO: YELLOW
GLUCOSE UR STRIP-MCNC: NEGATIVE MG/DL
HGB UR QL STRIP: ABNORMAL
KETONES UR STRIP-MCNC: NEGATIVE MG/DL
LEUKOCYTE ESTERASE UR QL STRIP: NEGATIVE
NITRATE UR QL: NEGATIVE
PH UR STRIP: 6 PH (ref 5–7)
SP GR UR STRIP: 1.02 (ref 1–1.03)
UROBILINOGEN UR STRIP-ACNC: 0.2 EU/DL (ref 0.2–1)

## 2020-01-22 ASSESSMENT — PAIN SCALES - GENERAL: PAINLEVEL: NO PAIN (0)

## 2020-01-22 ASSESSMENT — MIFFLIN-ST. JEOR: SCORE: 1658.08

## 2020-01-22 NOTE — LETTER
"2020       RE: Yolande Yadav  [address] [place] [address]     Dear Colleague,    Thank you for referring your patient, Yolande Yadav, to the [facility] UROLOGY AND INST FOR PROSTATE AND UROLOGIC CANCERS at [facility]. Please see a copy of my visit note below.    CC:  Bedwetting    HPI:  Yolande Yadav is a 24 year old female asked to be seen in consultation by Ms. Tena.  for the above.  This problem has been going on for many years and has been getting worse.  It is  associated with urinary urgency and frequency but no definite day time incontinence.  However almost every night she is having incontinence.  It was just around the time of her periods but again now almost every night.  She used to have recurrent uti's as a child and underwent a \"dye\" study which was normal. She has had no previous treatment.  She did some pelvic floor PT and that helped a little. The patient voids q1 hours, nocturia X 0.  She drinks mainly water and diet pepsi and coffee once per week.  She drinks 2-3 20 oz bottles of diet pepsi per day.  She denies any dysuria (except after intercourse),  hematuria, hesitancy, intermittency, feeling of incomplete emptying, or any recent hx of UTI's or stones.    The patient has no constipation or splinting.  She is sexually active and has some suprapubic pain with this.  She has a female partner.   She denies any vaginal bulge.  She has no neurological or balance problems.     Obstetric Hx:  She is .   Periods are regular, they are not heavy, no menstrual cramps.    Past Medical History:   Diagnosis Date     Depression 2015     Depressive disorder 2016     Pyelonephritis     recurrent as child     Uncomplicated asthma 2010    sports induced       Past Surgical History:   Procedure Laterality Date     EXCIS VAGINAL CYST/TUMOR       HC TOOTH EXTRACTION W/FORCEP         Meds, Allergies, FHx and SHx reviewed per nurse's intake note.    ROS is " "below.  All other positive and pertinent information is mentioned in the HPI.    PEx:   Blood pressure 117/78, pulse 84, height 1.6 m (5' 3\"), weight 93.9 kg (207 lb), not currently breastfeeding.  5' 3\", Body mass index is 36.67 kg/m ., 207 lbs 0 oz  Gen appearance:  Well groomed  HEENT:  EOMI, AT NC  Psych:  Normal Affect  Neuro:  A/O X 3  Skin:  Warm to touch  Resp:  No increased respiratory effort  Vasc:  RRR  lymph:  No LE edema  Musk:  Full ROM in extremities  : deferred    RU: 0 cc on bladder scan by nursing.    UA: UA RESULTS:  Recent Labs   Lab Test 01/22/20  0851 10/08/18  1406   COLOR Yellow Yellow   APPEARANCE Clear Clear   URINEGLC Negative Negative   URINEBILI Negative Negative   URINEKETONE Negative Negative   SG 1.025 1.025   UBLD Trace* Negative   URINEPH 6.0 6.0   PROTEIN Negative Negative   UROBILINOGEN 0.2 0.2   NITRITE Negative Negative   LEUKEST Negative Trace*   RBCU  --  O - 2   WBCU  --  0 - 5       ASSESSMENT and PLAN:  This is a 24 year old female with urine urgency and frequency as well as nocturnal enuresis.  She also has some bladder discomfort and dyspareunia.  Different management options were discussed with the patient including observation, medication, PT, and further w/u.  Given her young age and the duration of what has been going on we decided to proceed with further w/u.  -voiding diary  -urine for mycoplasma and ureaplasma  -UDS  -f/u for cystoscopy    Thank you for allowing me to participate in Ms. Yadav's care.  I will keep you updated on her progress.    Angela Harley MD    Answers for HPI/ROS submitted by the patient on 1/19/2020   General Symptoms: No  Skin Symptoms: No  HENT Symptoms: No  EYE SYMPTOMS: No  HEART SYMPTOMS: No  LUNG SYMPTOMS: No  INTESTINAL SYMPTOMS: No  URINARY SYMPTOMS: Yes  GYNECOLOGIC SYMPTOMS: No  BREAST SYMPTOMS: No  SKELETAL SYMPTOMS: No  BLOOD SYMPTOMS: No  NERVOUS SYSTEM SYMPTOMS: No  MENTAL HEALTH SYMPTOMS: Yes  Trouble holding urine or " incontinence: Yes  Pain or burning: No  Trouble starting or stopping: No  Increased frequency of urination: Yes  Blood in urine: No  Decreased frequency of urination: No  Frequent nighttime urination: Yes  Flank pain: No  Difficulty emptying bladder: No  Nervous or Anxious: Yes  Depression: Yes  Trouble sleeping: No  Trouble thinking or concentrating: Yes  Mood changes: No  Panic attacks: No      Again, thank you for allowing me to participate in the care of your patient.      Sincerely,    Angela Harley MD

## 2020-01-22 NOTE — PATIENT INSTRUCTIONS
Schedule urodynamics.  Please complete bladder diary and bring to the UDS appointment.  Schedule cystoscopy after this with Dr. Harley.      It was a pleasure meeting with you today.  Thank you for allowing me and my team the privilege of caring for you today.  YOU are the reason we are here, and I truly hope we provided you with the excellent service you deserve.  Please let us know if there is anything else we can do for you so that we can be sure you are leaving completely satisfied with your care experience.

## 2020-01-22 NOTE — PROGRESS NOTES
"CC:  Bedwetting    HPI:  Yolande Yadav is a 24 year old female asked to be seen in consultation by Ms. Tena.  for the above.  This problem has been going on for many years and has been getting worse.  It is  associated with urinary urgency and frequency but no definite day time incontinence.  However almost every night she is having incontinence.  It was just around the time of her periods but again now almost every night.  She used to have recurrent uti's as a child and underwent a \"dye\" study which was normal. She has had no previous treatment.  She did some pelvic floor PT and that helped a little. The patient voids q1 hours, nocturia X 0.  She drinks mainly water and diet pepsi and coffee once per week.  She drinks 2-3 20 oz bottles of diet pepsi per day.  She denies any dysuria (except after intercourse),  hematuria, hesitancy, intermittency, feeling of incomplete emptying, or any recent hx of UTI's or stones.    The patient has no constipation or splinting.  She is sexually active and has some suprapubic pain with this.  She has a female partner.   She denies any vaginal bulge.  She has no neurological or balance problems.     Obstetric Hx:  She is .   Periods are regular, they are not heavy, no menstrual cramps.    Past Medical History:   Diagnosis Date     Depression      Depressive disorder 2016     Pyelonephritis     recurrent as child     Uncomplicated asthma 2010    sports induced       Past Surgical History:   Procedure Laterality Date     EXCIS VAGINAL CYST/TUMOR       HC TOOTH EXTRACTION W/FORCEP         Meds, Allergies, FHx and SHx reviewed per nurse's intake note.    ROS is below.  All other positive and pertinent information is mentioned in the HPI.    PEx:   Blood pressure 117/78, pulse 84, height 1.6 m (5' 3\"), weight 93.9 kg (207 lb), not currently breastfeeding.  5' 3\", Body mass index is 36.67 kg/m ., 207 lbs 0 oz  Gen appearance:  Well groomed  HEENT:  EOMI, AT NC  Psych:  " Normal Affect  Neuro:  A/O X 3  Skin:  Warm to touch  Resp:  No increased respiratory effort  Vasc:  RRR  lymph:  No LE edema  Musk:  Full ROM in extremities  : deferred    RU: 0 cc on bladder scan by nursing.    UA: UA RESULTS:  Recent Labs   Lab Test 01/22/20  0851 10/08/18  1406   COLOR Yellow Yellow   APPEARANCE Clear Clear   URINEGLC Negative Negative   URINEBILI Negative Negative   URINEKETONE Negative Negative   SG 1.025 1.025   UBLD Trace* Negative   URINEPH 6.0 6.0   PROTEIN Negative Negative   UROBILINOGEN 0.2 0.2   NITRITE Negative Negative   LEUKEST Negative Trace*   RBCU  --  O - 2   WBCU  --  0 - 5       ASSESSMENT and PLAN:  This is a 24 year old female with urine urgency and frequency as well as nocturnal enuresis.  She also has some bladder discomfort and dyspareunia.  Different management options were discussed with the patient including observation, medication, PT, and further w/u.  Given her young age and the duration of what has been going on we decided to proceed with further w/u.  -voiding diary  -urine for mycoplasma and ureaplasma  -UDS  -f/u for cystoscopy    Thank you for allowing me to participate in Ms. Yadav's care.  I will keep you updated on her progress.    Angela Harley MD    Answers for HPI/ROS submitted by the patient on 1/19/2020   General Symptoms: No  Skin Symptoms: No  HENT Symptoms: No  EYE SYMPTOMS: No  HEART SYMPTOMS: No  LUNG SYMPTOMS: No  INTESTINAL SYMPTOMS: No  URINARY SYMPTOMS: Yes  GYNECOLOGIC SYMPTOMS: No  BREAST SYMPTOMS: No  SKELETAL SYMPTOMS: No  BLOOD SYMPTOMS: No  NERVOUS SYSTEM SYMPTOMS: No  MENTAL HEALTH SYMPTOMS: Yes  Trouble holding urine or incontinence: Yes  Pain or burning: No  Trouble starting or stopping: No  Increased frequency of urination: Yes  Blood in urine: No  Decreased frequency of urination: No  Frequent nighttime urination: Yes  Flank pain: No  Difficulty emptying bladder: No  Nervous or Anxious: Yes  Depression: Yes  Trouble  sleeping: No  Trouble thinking or concentrating: Yes  Mood changes: No  Panic attacks: No

## 2020-01-22 NOTE — NURSING NOTE
"Chief Complaint   Patient presents with     Consult     recurring uti's since little       Blood pressure 117/78, pulse 84, height 1.6 m (5' 3\"), weight 93.9 kg (207 lb), not currently breastfeeding. Body mass index is 36.67 kg/m .    Patient Active Problem List   Diagnosis     CARDIOVASCULAR SCREENING; LDL GOAL LESS THAN 160     History of pyelonephritis     Recurrent UTI     Anxiety     Class 1 obesity due to excess calories without serious comorbidity with body mass index (BMI) of 33.0 to 33.9 in adult     Mild persistent asthma without complication     History of depression     Moderate episode of recurrent major depressive disorder (H)     Nocturnal enuresis       Allergies   Allergen Reactions     Seasonal Allergies        Current Outpatient Medications   Medication Sig Dispense Refill     ACETAMINOPHEN PO        albuterol (PROAIR HFA/PROVENTIL HFA/VENTOLIN HFA) 108 (90 Base) MCG/ACT inhaler Inhale 2 puffs into the lungs every 6 hours 8.5 g 3     busPIRone (BUSPAR) 10 MG tablet Take 1 tablet (10 mg) by mouth 2 times daily 180 tablet 0     citalopram (CELEXA) 20 MG tablet Take 1 tablet (20 mg) by mouth daily 90 tablet 1     fluticasone (FLONASE) 50 MCG/ACT spray Spray 1 spray into both nostrils daily       hydrOXYzine (ATARAX) 25 MG tablet Take 1-2 tablets (25-50 mg) by mouth every 6 hours as needed for anxiety 60 tablet 1     IBUPROFEN PO        UNKNOWN TO PATIENT Allergy tablet daily.         Social History     Tobacco Use     Smoking status: Never Smoker     Smokeless tobacco: Never Used   Substance Use Topics     Alcohol use: Yes     Alcohol/week: 0.0 standard drinks     Frequency: Monthly or less     Drinks per session: 1 or 2     Binge frequency: Never     Comment: OCC     Drug use: No       Moses Farr LPN  1/22/2020  8:39 AM  "

## 2020-01-31 ENCOUNTER — PRE VISIT (OUTPATIENT)
Dept: UROLOGY | Facility: CLINIC | Age: 25
End: 2020-01-31

## 2020-02-05 ENCOUNTER — OFFICE VISIT (OUTPATIENT)
Dept: UROLOGY | Facility: CLINIC | Age: 25
End: 2020-02-05
Payer: COMMERCIAL

## 2020-02-05 ENCOUNTER — ANCILLARY PROCEDURE (OUTPATIENT)
Dept: RADIOLOGY | Facility: AMBULATORY SURGERY CENTER | Age: 25
End: 2020-02-05
Attending: PHYSICIAN ASSISTANT
Payer: COMMERCIAL

## 2020-02-05 VITALS
WEIGHT: 207 LBS | BODY MASS INDEX: 36.68 KG/M2 | SYSTOLIC BLOOD PRESSURE: 120 MMHG | HEART RATE: 59 BPM | HEIGHT: 63 IN | DIASTOLIC BLOOD PRESSURE: 70 MMHG

## 2020-02-05 VITALS
HEART RATE: 59 BPM | DIASTOLIC BLOOD PRESSURE: 70 MMHG | HEIGHT: 63 IN | SYSTOLIC BLOOD PRESSURE: 120 MMHG | WEIGHT: 207 LBS | BODY MASS INDEX: 36.68 KG/M2

## 2020-02-05 DIAGNOSIS — N32.81 OVERACTIVE BLADDER: ICD-10-CM

## 2020-02-05 DIAGNOSIS — N32.81 OVERACTIVE BLADDER: Primary | ICD-10-CM

## 2020-02-05 DIAGNOSIS — R39.15 URINARY URGENCY: ICD-10-CM

## 2020-02-05 DIAGNOSIS — N39.44 NOCTURNAL ENURESIS: Primary | ICD-10-CM

## 2020-02-05 DIAGNOSIS — R35.0 FREQUENCY OF URINATION: ICD-10-CM

## 2020-02-05 LAB
ALBUMIN UR-MCNC: NEGATIVE MG/DL
APPEARANCE UR: CLEAR
BILIRUB UR QL STRIP: NEGATIVE
COLOR UR AUTO: YELLOW
GLUCOSE UR STRIP-MCNC: NEGATIVE MG/DL
HGB UR QL STRIP: NEGATIVE
KETONES UR STRIP-MCNC: NEGATIVE MG/DL
LEUKOCYTE ESTERASE UR QL STRIP: NEGATIVE
NITRATE UR QL: NEGATIVE
PH UR STRIP: 6.5 PH (ref 5–7)
SP GR UR STRIP: >1.03 (ref 1–1.03)
UROBILINOGEN UR STRIP-ACNC: 0.2 EU/DL (ref 0.2–1)

## 2020-02-05 ASSESSMENT — PAIN SCALES - GENERAL
PAINLEVEL: NO PAIN (0)

## 2020-02-05 ASSESSMENT — MIFFLIN-ST. JEOR
SCORE: 1658.08
SCORE: 1658.08

## 2020-02-05 NOTE — LETTER
2/5/2020     RE: Yolande Yadav  [address] [place] [address]     Dear Colleague,    Thank you for referring your patient, Yolande Yadav, to the [facility] UROLOGY AND INST FOR PROSTATE AND UROLOGIC CANCERS at [facility]. Please see a copy of my visit note below.    Reason for Visit:  Cystoscopy    Clinical Data: Ms. Yolande Yadav is a 24 year old female with a hx of urinary urgency and frequency as well as nocturnal enuresis and dyspareunia.  She underwent UDS and presents for cystoscopy.    Voiding diary  Voids every 40 minutes to 2 hours during the day, nocturia x 0.  Episodes of incontinence: none recorded.  Incontinence associated with: n/a.  Total Volume Intake: 3840 mL; 2660 of which is diet soda.  Total Volume Output: 4230 mL; average voided volume 265 mL, largest voided volume 475 mL.    UDS 2/5/20:  -Large fluid intake noted on bladder diary (3.8 L total, of which 2.7 L is diet soda), with consumption of fluids late into the evening hours (including large amounts of caffeine prior to bed).  -Normal bladder capacity (515 mL) with normal filling sensations.  -Normal bladder compliance without DO/DOI.  -No NEEL.  -Good detrusor contraction during voiding to max Pdet 36 cm H2O which she supplements with a minimal amount of abdominal straining.  -Good flow rate (Qmax 27 mL/s) with a bell-shape flow curve and near complete bladder emptying (final PVR 46 mL).  -Mildly increased EMG activity during voiding - likely correlates with abdominal straining.  -No evidence for bladder outlet obstruction.  -Fluoroscopy reveals a smooth bladder wall without diverticuli or VUR. Bladder neck is closed during filling and open during voiding.    Cystoscopy procedure:  Pt. Was consented and placed in the lithotomy position.  She was cleaned and preparred in the usual fashion.  Lidocain gel was inserted into the urethra and given time to take effect.  A 16 fr flexible cystoscope was then  inserted through the urethra and into the bladder.  The urethra was wnl.  The bladder was with 1+ trabeculation.  No tumors, diverticulae, or stones.  There is however hypervascularity throughout the bladder especially along the posterior and lateral walls.  Bilateral u/o's were effluxing clear urine.  The cystoscope was then withdrawn.  The pt. Tolerated the procedure well.    A/P:  24 year old female with irritative voiding symptoms and overactive bladder with nocturnal enuresis.  We discussed possibly inflammation in the bladder and discussed trying limiting her soda intake to see if this would help.  If not we could consider IC cocktail injection to help reduce inflammation in the area.  -send urine for ureaplasma and mycoplasma  -limit soda  -f/u in 1 month to reassess.  If no improvement then consider IC cocktail.    Thank you for allowing me to participate in the care of  Ms. Yolande Yadav and I will keep you updated on her progress.    Angela Harley MD

## 2020-02-05 NOTE — NURSING NOTE
Invasive Procedure Safety Checklist:    Procedure:  Cystoscopy    Action: Complete sections and checkboxes as appropriate.    Pre-procedure:  1. Patient ID Verified with 2 identifiers (Zuly and  or MRN) : YES    2. Procedure and site verified with patient/designee (when able) : YES    3. Accurate consent documentation in medical record : YES    4. H&P (or appropriate assessment) documented in medical record : NO  H&P must be up to 30 days prior to procedure an updated within 24 hours of                 Procedure as applicable.     5. Relevant diagnostic and radiology test results appropriately labeled and displayed as applicable : NO    6. Blood products, implants, devices, and/or special equipment available for the procedure as applicable : NO    7. Procedure site(s) marked with provider initials [Exclusions: ] : NO    8. Marking not required. Reason : Yes  Procedure does not require site marking    Time Out:     Time-Out performed immediately prior to starting procedure, including verbal and active participation of all team members addressing: YES    1. Correct patient identity.  2. Confirmed that the correct side and site are marked.  3. An accurate procedure to be done.  4. Agreement on the procedure to be done.  5. Correct patient position.  6. Relevant images and results are properly labeled and appropriately displayed.  7. The need to administer antibiotics or fluids for irrigation purposes during the procedure as applicable.  8. Safety precautions based on patient history or medication use.    During Procedure: Verification of correct person, site, and procedure occurs any time the responsibility for care of the patient is transferred to another member of the care team.    Patricia Moeller, [facility]

## 2020-02-05 NOTE — NURSING NOTE
Invasive Procedure Safety Checklist:    Procedure: Urodynamics Study    Action: Complete sections and checkboxes as appropriate.    Pre-procedure:  1. Patient ID Verified with 2 identifiers (Zuly and  or MRN) : YES    2. Procedure and site verified with patient/designee (when able) : YES    3. Accurate consent documentation in medical record : YES    4. H&P (or appropriate assessment) documented in medical record : NO  H&P must be up to 30 days prior to procedure an updated within 24 hours of                 Procedure as applicable.     5. Relevant diagnostic and radiology test results appropriately labeled and displayed as applicable : NO    6. Blood products, implants, devices, and/or special equipment available for the procedure as applicable : NO    7. Procedure site(s) marked with provider initials [Exclusions: ] : NO    8. Marking not required. Reason : Yes  Procedure does not require site marking    Time Out:     Time-Out performed immediately prior to starting procedure, including verbal and active participation of all team members addressing: YES    1. Correct patient identity.  2. Confirmed that the correct side and site are marked.  3. An accurate procedure to be done.  4. Agreement on the procedure to be done.  5. Correct patient position.  6. Relevant images and results are properly labeled and appropriately displayed.  7. The need to administer antibiotics or fluids for irrigation purposes during the procedure as applicable.  8. Safety precautions based on patient history or medication use.    During Procedure: Verification of correct person, site, and procedure occurs any time the responsibility for care of the patient is transferred to another member of the care team.    Patricia Moeller, [facility]

## 2020-02-05 NOTE — NURSING NOTE
The following medication was given:     MEDICATION:  Omnipaque (Iohexol Injection)  ROUTE: Provider Administered  SITE: Provider Administered via catheter  DOSE: 240mL  LOT #: [id number]  : [employer]  EXPIRATION DATE: 9/6/2022  NDC#: [address]-04   Was there drug waste? No    Prior to injection, verified patient identity using patient's name and date of birth.  Due to injection administration, patient instructed to remain in clinic for 15 minutes  afterwards, and to report any adverse reaction to me immediately.    Drug Amount Wasted:  None.  Vial/Syringe: Single dose vial          Patricia Moeller CMA  2/5/2020  9:36 AM

## 2020-02-05 NOTE — PATIENT INSTRUCTIONS
"Lab today.  Follow up with Dr. Harley in approximately 1 month.      AFTER YOUR CYSTOSCOPY        You have just completed a cystoscopy, or \"cysto\", which allowed your physician to learn more about your bladder (or to remove a stent placed after surgery). We suggest that you continue to avoid caffeine, fruit juice, and alcohol for the next 24 hours, however, you are encouraged to return to your normal activities.         A few things that are considered normal after your cystoscopy:     * Small amount of bleeding (or spotting) that clears within the next 24 hours     * Slight burning sensation with urination     * Sensation to of needing to avoid more frequently     * The feeling of \"air\" in your urine     * Mild discomfort that is relieved with Tylenol        Please contact our office promptly if you:     * Develop a fever above 101 degrees     * Are unable to urinate     * Develop bright red blood that does not stop     * Severe pain or swelling         "

## 2020-02-05 NOTE — PROGRESS NOTES
Reason for Visit:  Cystoscopy    Clinical Data: Ms. Yolande Yadav is a 24 year old female with a hx of urinary urgency and frequency as well as nocturnal enuresis and dyspareunia.  She underwent UDS and presents for cystoscopy.    Voiding diary  Voids every 40 minutes to 2 hours during the day, nocturia x 0.  Episodes of incontinence: none recorded.  Incontinence associated with: n/a.  Total Volume Intake: 3840 mL; 2660 of which is diet soda.  Total Volume Output: 4230 mL; average voided volume 265 mL, largest voided volume 475 mL.    UDS 2/5/20:  -Large fluid intake noted on bladder diary (3.8 L total, of which 2.7 L is diet soda), with consumption of fluids late into the evening hours (including large amounts of caffeine prior to bed).  -Normal bladder capacity (515 mL) with normal filling sensations.  -Normal bladder compliance without DO/DOI.  -No NEEL.  -Good detrusor contraction during voiding to max Pdet 36 cm H2O which she supplements with a minimal amount of abdominal straining.  -Good flow rate (Qmax 27 mL/s) with a bell-shape flow curve and near complete bladder emptying (final PVR 46 mL).  -Mildly increased EMG activity during voiding - likely correlates with abdominal straining.  -No evidence for bladder outlet obstruction.  -Fluoroscopy reveals a smooth bladder wall without diverticuli or VUR. Bladder neck is closed during filling and open during voiding.    Cystoscopy procedure:  Pt. Was consented and placed in the lithotomy position.  She was cleaned and preparred in the usual fashion.  Lidocain gel was inserted into the urethra and given time to take effect.  A 16 fr flexible cystoscope was then inserted through the urethra and into the bladder.  The urethra was wnl.  The bladder was with 1+ trabeculation.  No tumors, diverticulae, or stones.  There is however hypervascularity throughout the bladder especially along the posterior and lateral walls.  Bilateral u/o's were effluxing clear urine.  The  cystoscope was then withdrawn.  The pt. Tolerated the procedure well.    A/P:  24 year old female with irritative voiding symptoms and overactive bladder with nocturnal enuresis.  We discussed possibly inflammation in the bladder and discussed trying limiting her soda intake to see if this would help.  If not we could consider IC cocktail injection to help reduce inflammation in the area.  -send urine for ureaplasma and mycoplasma  -limit soda  -f/u in 1 month to reassess.  If no improvement then consider IC cocktail.    Thank you for allowing me to participate in the care of  Ms. Yolande Yadav and I will keep you updated on her progress.    Angela Harley MD

## 2020-02-05 NOTE — PROGRESS NOTES
PREPROCEDURE DIAGNOSES:    1. Urinary urgency, frequency  2. Nocturnal enuresis    POSTPROCEDURE DIAGNOSES:  -Large fluid intake noted on bladder diary (3.8 L total, of which 2.7 L is diet soda), with consumption of fluids late into the evening hours (including large amounts of caffeine prior to bed).  -Normal bladder capacity (515 mL) with normal filling sensations.  -Normal bladder compliance without DO/DOI.  -No NEEL.  -Good detrusor contraction during voiding to max Pdet 36 cm H2O which she supplements with a minimal amount of abdominal straining.  -Good flow rate (Qmax 27 mL/s) with a bell-shape flow curve and near complete bladder emptying (final PVR 46 mL).  -Mildly increased EMG activity during voiding - likely correlates with abdominal straining.  -No evidence for bladder outlet obstruction.  -Fluoroscopy reveals a smooth bladder wall without diverticuli or VUR. Bladder neck is closed during filling and open during voiding.    PROCEDURE:    1. Uroflowmetry.  2. Sterile urethral catheterization for measurement of postvoid residual urine volume.  3. Complex filling cystometrogram with measurement of bladder and rectal pressures.  4. Complex voiding cystometrogram with measurement of bladder and rectal pressures.  5. Electromyography of the pelvic floor during urodynamics.  6. Fluoroscopic imaging of the bladder during urodynamics, at least 3 views.    7. Interpretation of urodynamics and flouroscopic imaging.      INDICATIONS FOR PROCEDURE:  Ms. Yolande Yadav is a pleasant 24 year old female with urinary frequency, urgency, and nocturnal enuresis. Baseline video urodynamic assessment is requested today by Dr. Harley to better characterize Ms. Yolande Yadav's voiding dysfunction.      VOIDING DIARY:  Voids every 40 minutes to 2 hours during the day, nocturia x 0.  Episodes of incontinence: none recorded.  Incontinence associated with: n/a.  Total Volume Intake: 3840 mL; 2660 of which is diet soda.  Total Volume  Output: 4230 mL; average voided volume 265 mL, largest voided volume 475 mL.    DESCRIPTION OF PROCEDURE:  Risks, benefits, and alternatives to urodynamics were discussed with the patient and she wished to proceed.  Urodynamics are planned to better assess the primary etiology for Ms. Yadav's urologic dysfunction.  The patient does not currently take anticholinergic medications for her bladder.  After informed consent was obtained, the patient was taken to the procedure room where uroflowmetry was performed. Findings below.     PRE-STUDY UROFLOWMETRY:  Voided volume: 32 mL.  Maximum flow rate: 21.1 mL/sec.  Average flow rate: 6.9 mL/sec.  Character of the curve: bell-shape, though voided volume insufficient to reliably interpret.  Postvoid residual by catheter: 15 mL.  Pretest urine dipstick was negative for leukocytes and nitrites.    Next a 7F double-lumen urodynamics catheter was inserted into the bladder under sterile technique via urethra.  A 7F abdominal manometry catheter was placed in the rectum.  EMG pads were placed on both sides of the anal verge.  The bladder was filled with 200 mL of Iohexol at 50 mL/minute and serial pressures were recorded.  With coughing there was an appropriate rise in vesical and abdominal pressures with no change in detrusor pressure, confirming good study catheter placement.    DURING THE FILLING PHASE:  First sensation: 194 mL.  First Desire: 286 mL.  Strong Desire: 451 mL.  Maximum Capacity: 514 mL.    Uninhibited detrusor contractions: none.  Compliance: normal. PDet essentially 0 cm H2O throughout filling.  Continence: no DOI or NEEL.  EMG: concordant during filling.    DURING THE VOIDING PHASE:  Maximum detrusor contraction with void: 36 cm of H2O pressure.  Voided volume: 468 mL.  Maximum flow rate: 27 mL/sec.  Average flow rate: 10 mL/sec.  Postvoid Residual: 46 mL.  EMG activity: mildly increased.  Character of voiding curve: bell-shape.  BOOI: -27.9 (suggesting no  obstruction - see key below)  [obstructed (SCHWARTZ index [BOOI] ? 40); equivocal (no definite   obstruction; BOOI 20-40); and no obstruction (BOOI ? 20)]    FLUOROSCOPIC IMAGING OF THE BLADDER DURING URODYNAMICS:  Please note, image numbers on UDS tracings correlate with iSite series numbers on PACS images. Fluoroscopy during today's procedure demonstrated a smooth bladder wall without diverticulae or cellules.  No vesicoureteral reflux was observed.  The bladder neck had a slightly open appearance during filling and appeared wide open during voiding.  After voiding to completion, all catheters were removed and the patient was brought back into the consultation room to further discuss today's study results.      ASSESSMENT/PLAN:  Ms. Yolande Yadav is a pleasant 24 year old female with urinary frequency, urgency, and nocturnal enuresis who demonstrated the following findings today on urodynamic evaluation:    -Large fluid intake noted on bladder diary (3.8 L total, of which 2.7 L is diet soda), with consumption of fluids late into the evening hours (including large amounts of caffeine prior to bed).  -Normal bladder capacity (515 mL) with normal filling sensations.  -Normal bladder compliance without DO/DOI.  -No NEEL.  -Good detrusor contraction during voiding to max Pdet 36 cm H2O which she supplements with a minimal amount of abdominal straining.  -Good flow rate (Qmax 27 mL/s) with a bell-shape flow curve and near complete bladder emptying (final PVR 46 mL).  -Mildly increased EMG activity during voiding - likely correlates with abdominal straining.  -No evidence for bladder outlet obstruction.  -Fluoroscopy reveals a smooth bladder wall without diverticuli or VUR. Bladder neck is closed during filling and open during voiding.    The patient will follow up as scheduled with Dr. Harley to further discuss today's study results and make plans for how best to proceed.      - Given presumed normal genitourinary anatomy with  no other identifiable risk factors, antibiotic prophylaxis was not administered today per department protocol.  The risk of UTI with VUDS is low at ~2.5-3%.      Thank you for allowing me to participate in the care of Ms. Yolande Yadav and please don't hesitate to contact me with any questions or concerns.      Dhara Levin PA-C  Urology Physician Assistant

## 2020-02-05 NOTE — NURSING NOTE
Chief Complaint   Patient presents with     RECHECK     Frequency/urgency follow up with cystoscopy     Patricia Moeller, CMA

## 2020-02-05 NOTE — LETTER
2/5/2020     RE: Yolande Yadav  [address] [place] [address]     Dear Colleague,    Thank you for referring your patient, Yolande Yadav, to the [facility] FOR PROSTATE AND UROLOGIC CANCERS at [facility]. Please see a copy of my visit note below.    PREPROCEDURE DIAGNOSES:    1. Urinary urgency, frequency  2. Nocturnal enuresis    POSTPROCEDURE DIAGNOSES:  -Large fluid intake noted on bladder diary (3.8 L total, of which 2.7 L is diet soda), with consumption of fluids late into the evening hours (including large amounts of caffeine prior to bed).  -Normal bladder capacity (515 mL) with normal filling sensations.  -Normal bladder compliance without DO/DOI.  -No NEEL.  -Good detrusor contraction during voiding to max Pdet 36 cm H2O which she supplements with a minimal amount of abdominal straining.  -Good flow rate (Qmax 27 mL/s) with a bell-shape flow curve and near complete bladder emptying (final PVR 46 mL).  -Mildly increased EMG activity during voiding - likely correlates with abdominal straining.  -No evidence for bladder outlet obstruction.  -Fluoroscopy reveals a smooth bladder wall without diverticuli or VUR. Bladder neck is closed during filling and open during voiding.    PROCEDURE:    1. Uroflowmetry.  2. Sterile urethral catheterization for measurement of postvoid residual urine volume.  3. Complex filling cystometrogram with measurement of bladder and rectal pressures.  4. Complex voiding cystometrogram with measurement of bladder and rectal pressures.  5. Electromyography of the pelvic floor during urodynamics.  6. Fluoroscopic imaging of the bladder during urodynamics, at least 3 views.    7. Interpretation of urodynamics and flouroscopic imaging.      INDICATIONS FOR PROCEDURE:  Ms. Yolande Yadav is a pleasant 24 year old female with urinary frequency, urgency, and nocturnal enuresis. Baseline video urodynamic assessment is requested today by  Dr. Harley to better characterize Ms. Yolande Yadav's voiding dysfunction.      VOIDING DIARY:  Voids every 40 minutes to 2 hours during the day, nocturia x 0.  Episodes of incontinence: none recorded.  Incontinence associated with: n/a.  Total Volume Intake: 3840 mL; 2660 of which is diet soda.  Total Volume Output: 4230 mL; average voided volume 265 mL, largest voided volume 475 mL.    DESCRIPTION OF PROCEDURE:  Risks, benefits, and alternatives to urodynamics were discussed with the patient and she wished to proceed.  Urodynamics are planned to better assess the primary etiology for Ms. Housers urologic dysfunction.  The patient does not currently take anticholinergic medications for her bladder.  After informed consent was obtained, the patient was taken to the procedure room where uroflowmetry was performed. Findings below.     PRE-STUDY UROFLOWMETRY:  Voided volume: 32 mL.  Maximum flow rate: 21.1 mL/sec.  Average flow rate: 6.9 mL/sec.  Character of the curve: bell-shape, though voided volume insufficient to reliably interpret.  Postvoid residual by catheter: 15 mL.  Pretest urine dipstick was negative for leukocytes and nitrites.    Next a 7F double-lumen urodynamics catheter was inserted into the bladder under sterile technique via urethra.  A 7F abdominal manometry catheter was placed in the rectum.  EMG pads were placed on both sides of the anal verge.  The bladder was filled with 200 mL of Iohexol at 50 mL/minute and serial pressures were recorded.  With coughing there was an appropriate rise in vesical and abdominal pressures with no change in detrusor pressure, confirming good study catheter placement.    DURING THE FILLING PHASE:  First sensation: 194 mL.  First Desire: 286 mL.  Strong Desire: 451 mL.  Maximum Capacity: 514 mL.    Uninhibited detrusor contractions: none.  Compliance: normal. PDet essentially 0 cm H2O throughout filling.  Continence: no DOI or NEEL.  EMG: concordant during  filling.    DURING THE VOIDING PHASE:  Maximum detrusor contraction with void: 36 cm of H2O pressure.  Voided volume: 468 mL.  Maximum flow rate: 27 mL/sec.  Average flow rate: 10 mL/sec.  Postvoid Residual: 46 mL.  EMG activity: mildly increased.  Character of voiding curve: bell-shape.  BOOI: -27.9 (suggesting no obstruction - see key below)  [obstructed (SCHWARTZ index [BOOI] ? 40); equivocal (no definite   obstruction; BOOI 20-40); and no obstruction (BOOI ? 20)]    FLUOROSCOPIC IMAGING OF THE BLADDER DURING URODYNAMICS:  Please note, image numbers on UDS tracings correlate with iSite series numbers on PACS images. Fluoroscopy during today's procedure demonstrated a smooth bladder wall without diverticulae or cellules.  No vesicoureteral reflux was observed.  The bladder neck had a slightly open appearance during filling and appeared wide open during voiding.  After voiding to completion, all catheters were removed and the patient was brought back into the consultation room to further discuss today's study results.      ASSESSMENT/PLAN:  Ms. Yolande Yadav is a pleasant 24 year old female with urinary frequency, urgency, and nocturnal enuresis who demonstrated the following findings today on urodynamic evaluation:    -Large fluid intake noted on bladder diary (3.8 L total, of which 2.7 L is diet soda), with consumption of fluids late into the evening hours (including large amounts of caffeine prior to bed).  -Normal bladder capacity (515 mL) with normal filling sensations.  -Normal bladder compliance without DO/DOI.  -No NEEL.  -Good detrusor contraction during voiding to max Pdet 36 cm H2O which she supplements with a minimal amount of abdominal straining.  -Good flow rate (Qmax 27 mL/s) with a bell-shape flow curve and near complete bladder emptying (final PVR 46 mL).  -Mildly increased EMG activity during voiding - likely correlates with abdominal straining.  -No evidence for bladder outlet  obstruction.  -Fluoroscopy reveals a smooth bladder wall without diverticuli or VUR. Bladder neck is closed during filling and open during voiding.    The patient will follow up as scheduled with Dr. Harley to further discuss today's study results and make plans for how best to proceed.      - Given presumed normal genitourinary anatomy with no other identifiable risk factors, antibiotic prophylaxis was not administered today per department protocol.  The risk of UTI with VUDS is low at ~2.5-3%.      Thank you for allowing me to participate in the care of Ms. Yolande Yadav and please don't hesitate to contact me with any questions or concerns.      Dhara Levin PA-C  Urology Physician Assistant

## 2020-02-12 LAB
BACTERIA SPEC CULT: NORMAL
BACTERIA SPEC CULT: NORMAL
SPECIMEN SOURCE: NORMAL
SPECIMEN SOURCE: NORMAL

## 2020-03-02 ENCOUNTER — TELEPHONE (OUTPATIENT)
Dept: PEDIATRICS | Facility: CLINIC | Age: 25
End: 2020-03-02

## 2020-03-02 NOTE — TELEPHONE ENCOUNTER
Type of outreach:  Sent [employer] message.  Health Maintenance Due   Topic Date Due     ANNUAL REVIEW OF HM ORDERS  1995     HIV SCREENING  08/07/2010       Last PHQ9 was 10 on 1-6-20 and GAD7 was 12, will resend to update score.    Camryn Rodriguez CMA([facility])

## 2020-03-05 ENCOUNTER — PRE VISIT (OUTPATIENT)
Dept: UROLOGY | Facility: CLINIC | Age: 25
End: 2020-03-05

## 2020-03-05 NOTE — TELEPHONE ENCOUNTER
Reason for Visit: Follow up symptom check    Diagnosis: OAB    Orders/Procedures/Records: in system    Contact Patient: n/a    Rooming Requirements: Ask patient about symptoms upon rooming. If positive for dysuria or patient believes they may have a UTI, obtain UA and dip.        Whitney Rodriguez LPN  03/05/20  9:08 AM

## 2020-03-09 NOTE — PROGRESS NOTES
Called patient- went straight to voicemail. Lvm asking to complete questionnaires rose mariep.     Irene Anne MA

## 2020-03-11 NOTE — PROGRESS NOTES
PHQ 11/26/2019 1/6/2020 3/10/2020   PHQ-9 Total Score 12 10 7   Q9: Thoughts of better off dead/self-harm past 2 weeks Not at all Not at all Not at all     EMILY-7 SCORE 11/26/2019 1/6/2020 3/10/2020   Total Score - - 6 (mild anxiety)   Total Score 17 12 6       Irene Anne MA

## 2020-03-27 ENCOUNTER — E-VISIT (OUTPATIENT)
Dept: PEDIATRICS | Facility: CLINIC | Age: 25
End: 2020-03-27
Payer: COMMERCIAL

## 2020-03-27 DIAGNOSIS — F41.9 ANXIETY: Primary | ICD-10-CM

## 2020-03-27 PROCEDURE — 99422 OL DIG E/M SVC 11-20 MIN: CPT | Performed by: NURSE PRACTITIONER

## 2020-03-27 ASSESSMENT — ANXIETY QUESTIONNAIRES
3. WORRYING TOO MUCH ABOUT DIFFERENT THINGS: SEVERAL DAYS
GAD7 TOTAL SCORE: 7
GAD7 TOTAL SCORE: 7
7. FEELING AFRAID AS IF SOMETHING AWFUL MIGHT HAPPEN: NOT AT ALL
1. FEELING NERVOUS, ANXIOUS, OR ON EDGE: SEVERAL DAYS
2. NOT BEING ABLE TO STOP OR CONTROL WORRYING: MORE THAN HALF THE DAYS
5. BEING SO RESTLESS THAT IT IS HARD TO SIT STILL: SEVERAL DAYS
7. FEELING AFRAID AS IF SOMETHING AWFUL MIGHT HAPPEN: NOT AT ALL
6. BECOMING EASILY ANNOYED OR IRRITABLE: NOT AT ALL
4. TROUBLE RELAXING: MORE THAN HALF THE DAYS

## 2020-03-28 ASSESSMENT — ANXIETY QUESTIONNAIRES: GAD7 TOTAL SCORE: 7

## 2020-12-20 ENCOUNTER — HEALTH MAINTENANCE LETTER (OUTPATIENT)
Age: 25
End: 2020-12-20

## 2021-01-15 ENCOUNTER — HEALTH MAINTENANCE LETTER (OUTPATIENT)
Age: 26
End: 2021-01-15

## 2021-02-22 ENCOUNTER — TELEPHONE (OUTPATIENT)
Dept: PEDIATRICS | Facility: CLINIC | Age: 26
End: 2021-02-22

## 2021-02-22 NOTE — LETTER
February 26, 2021      Yolande Yadav  [address]   [place] [address]        Dear Yolande,       We care about your health and have reviewed your health plan including your medical conditions, medications, and lab results.  Based on this review, it is recommended that you follow up regarding the following health topic(s):  -Asthma  -Depression    We recommend you take the following action(s):   -Complete and return the attached ASTHMA CONTROL TEST.  If your total score is 19 or less or you have been to the ER or urgent care for your asthma, then please schedule an asthma followup appointment.  -Complete and return the attached PHQ-9 Form.  If your total score is greater than 9, please schedule a followup appointment.  If you answer Yes to question 9, call your clinic between the hours of 8 to 5.  You may also call the [employer] Hotline at [phone] any time.    You can call our office and give the answers to the attached questionnaire to a nurse over the phone. Or you can complete this questionnaire in the My Chart message we sent you.  You can also drop the questionnaire off at the clinic or mail it back to us.  We do need this information in your medical chart.    Please call us at the [facility] - [phone] (or use [employer]) to address the above recommendations.     Thank you for trusting [facility] and we appreciate the opportunity to serve you.  We look forward to supporting your healthcare needs in the future.    Healthy Regards,    Your Health Care Team  [facility]

## 2021-02-22 NOTE — TELEPHONE ENCOUNTER
Patient Quality Outreach      Summary:  Patient has the following on her problem list/HM:   Depression / Dysthymia review  6 Month Remission: 4-8 month window range:   12 Month Remission: 10-14 month window range:   PHQ-9 SCORE 11/26/2019 1/6/2020 3/10/2020   PHQ-9 Total Score MyChart 12 (Moderate depression) - 7 (Mild depression)   PHQ-9 Total Score 12 10 7   If PHQ-9 recheck is 5 or more, route to provider for next steps.  Asthma review  ACT Total Scores 11/26/2019   ACT TOTAL SCORE (Goal Greater than or Equal to 20) 22   In the past 12 months, how many times did you visit the emergency room for your asthma without being admitted to the hospital? 0   In the past 12 months, how many times were you hospitalized overnight because of your asthma? 0       Patient is due/failing the following:   ACT needed and AAP, PHQ-9 Needed and Depression follow-up visit and Annual wellness, date due: 11/26/20    Type of outreach:    Sent [employer] message.+ PHQ9/EMILY + ACT    Questions for provider review:    None - provider OK with delaying physical as pap not due                                               Maura Brooks CMA    Chart routed to Care Team.

## 2021-02-26 NOTE — TELEPHONE ENCOUNTER
Patient Quality Outreach 2nd Attempt      Summary:    Type of outreach:    Sent letter., Copy of ACT mailed to patient. and PHQ9/EMILY    Next Steps:  Reach out within 90 days via Phone.    Max number of attempts reached: No. Will try again in 90 days if patient still on fail list.    Questions for provider review:    None                                                                                                                    Maura Brooks CMA    Chart routed to Care Team.

## 2021-03-05 NOTE — TELEPHONE ENCOUNTER
Patient Quality Outreach 3rd Attempt      Summary:    Type of outreach:    Phone, left message for patient/parent to call back.    Max number of attempts reached: Yes. Will try again in 90 days if patient still on fail list.    Questions for provider review:    None                                                                                                                    Maura Brooks CMA    Chart closed.

## 2021-05-20 ENCOUNTER — TELEPHONE (OUTPATIENT)
Dept: PEDIATRICS | Facility: CLINIC | Age: 26
End: 2021-05-20

## 2021-05-20 NOTE — LETTER
June 22, 2021      Yolande Yadav  [address]   [place] [address]        Dear Yolande,       We care about your health and have reviewed your health plan including your medical conditions, medications, and lab results.  Based on this review, it is recommended that you follow up regarding the following health topic(s):  -Asthma  -Depression    We recommend you take the following action(s):  -schedule a FOLLOWUP APPOINTMENT.   -Complete and return the attached ASTHMA CONTROL TEST.  If your total score is 19 or less or you have been to the ER or urgent care for your asthma, then please schedule an asthma followup appointment.        Please call us at the [facility] - [phone] (or use [employer]) to address the above recommendations.     Thank you for trusting [facility] and we appreciate the opportunity to serve you.  We look forward to supporting your healthcare needs in the future.    Healthy Regards,    Your Health Care Team  [facility]

## 2021-05-20 NOTE — TELEPHONE ENCOUNTER
Patient Quality Outreach      Summary:    Patient has the following on her problem list/HM:     Depression / Dysthymia review    12 Month Remission: 10-14 month window range: Out of range       PHQ-9 SCORE 11/26/2019 1/6/2020 3/10/2020   PHQ-9 Total Score MyChart 12 (Moderate depression) - 7 (Mild depression)   PHQ-9 Total Score 12 10 7       If PHQ-9 recheck is 5 or more, route to provider for next steps.    Asthma review       ACT Total Scores 11/26/2019   ACT TOTAL SCORE (Goal Greater than or Equal to 20) 22   In the past 12 months, how many times did you visit the emergency room for your asthma without being admitted to the hospital? 0   In the past 12 months, how many times were you hospitalized overnight because of your asthma? 0          Patient is due/failing the following:   ACT needed and Asthma follow-up visit, PHQ-9 Needed and Depression follow-up visit and Annual wellness, date due: Now    Type of outreach:    Sent [employer] message.    Questions for provider review:    None                                                                                                                                     Adrienne Jason CMA     Chart routed to Care Team.

## 2021-06-21 NOTE — TELEPHONE ENCOUNTER
Patient Quality Outreach 2nd Attempt      Summary:    Type of outreach:    Phone, left message for patient/parent to call back.    Next Steps:  Reach out within 90 days via Letter.    Max number of attempts reached: No. Will try again in 90 days if patient still on fail list.    Questions for provider review:    None                                                                                                                    Adrienne Jason [facility]     Chart routed to Care Team.

## 2021-06-22 NOTE — TELEPHONE ENCOUNTER
Patient Quality Outreach Summary      Summary:    Patient is due/failing the following:   ACT needed and PHQ-9 Needed and Depression follow-up visit    Type of outreach:    Sent letter.    Questions for provider review:    None                                                                                                                    Adrienne Jason CMA     Chart routed to Care Team.

## 2021-10-03 ENCOUNTER — HEALTH MAINTENANCE LETTER (OUTPATIENT)
Age: 26
End: 2021-10-03

## 2022-01-23 ENCOUNTER — HEALTH MAINTENANCE LETTER (OUTPATIENT)
Age: 27
End: 2022-01-23

## 2022-06-06 NOTE — PROGRESS NOTES
"History of Present Illness:  Patient is a 22 year old female who presents with worsening rash under LEFT breast in past 4-5 days.  First began due to increased friction and moisture in this area as she is a  for [employer].  She has been trying to self manage with hydrogen peroxide and other topical treatments without success.  No fevers or chills.  No nipple discharge.  No axillary LAD.    ROS:  General: Denies any fever or chills  HEENT: Denies eye pain, ear pain, throat pain or runny nose  NECK: Denies neck pain  LUNGS: Denies cough or SOB  CV: Denies chest pain  Abdomen: Denies abdominal pain, denies change in stool  SKIN: + rash.  NEURO: Denies dizziness  MUSCULOSKELETAL: Denies any ROM issues, no mylagias, no pain  PSYCH: Denies mood change    Objective:  /59 (BP Location: Left arm, Cuff Size: Adult Regular)  Pulse 65  Temp 97.2  F (36.2  C) (Oral)  Resp 16  Ht 5' 3\" (1.6 m)  Wt 184 lb (83.5 kg)  SpO2 97%  BMI 32.59 kg/m2    General:  Patient is alert.  In NAD.  HEENT: NC/AT, PERRL, EOMI  NECK: supple, no LAD  SKIN: Underside of LEFT breast has 3 cm superficial area of skin breakdown with underlying erythema covering entire underside of breast, tender to palpation- no discrete masses.    NEURO: CN II-XII grossly intact  MUSCULOSKELETAL: No deficits noted.  Normal strength and ROM in BUEs and BLEs.  PSYCH: Normal mood and affect    Assessment  Cellulitis LEFT breast     Plan:  Recommend hot packs to area.  Keep underside clean and dry-- cotton bra only when necessary.  Prescribed Augmentin 875mg po bid x 14 days.  Close FU if this does not begin to resolve quickly or if exceeds current markings or if new symptoms- fever, streaking.    All questions were answered.  Patient voices understanding of the plan at time of discharge.  " 18

## 2022-09-11 ENCOUNTER — HEALTH MAINTENANCE LETTER (OUTPATIENT)
Age: 27
End: 2022-09-11

## 2023-04-30 ENCOUNTER — HEALTH MAINTENANCE LETTER (OUTPATIENT)
Age: 28
End: 2023-04-30